# Patient Record
Sex: FEMALE | Race: WHITE | Employment: OTHER | URBAN - NONMETROPOLITAN AREA
[De-identification: names, ages, dates, MRNs, and addresses within clinical notes are randomized per-mention and may not be internally consistent; named-entity substitution may affect disease eponyms.]

---

## 2018-01-24 PROBLEM — M72.0 DUPUYTREN'S CONTRACTURE OF BOTH HANDS: Status: ACTIVE | Noted: 2018-01-24

## 2019-01-11 ENCOUNTER — HOSPITAL ENCOUNTER (OUTPATIENT)
Dept: WOMENS IMAGING | Age: 59
Discharge: HOME | End: 2019-01-13
Payer: COMMERCIAL

## 2019-01-11 DIAGNOSIS — Z12.31 VISIT FOR SCREENING MAMMOGRAM: ICD-10-CM

## 2019-01-11 PROCEDURE — 77067 SCR MAMMO BI INCL CAD: CPT

## 2019-01-11 PROCEDURE — 77063 BREAST TOMOSYNTHESIS BI: CPT

## 2019-01-12 LAB
AVERAGE GLUCOSE: 286 MG/DL (ref 66–114)
HBA1C MFR BLD: 11.6 %

## 2021-05-17 PROBLEM — M65.321 TRIGGER INDEX FINGER OF RIGHT HAND: Status: ACTIVE | Noted: 2021-05-17

## 2021-05-17 PROBLEM — F43.21 GRIEF AT LOSS OF CHILD: Status: ACTIVE | Noted: 2021-05-17

## 2021-05-17 PROBLEM — Z63.4 GRIEF AT LOSS OF CHILD: Status: ACTIVE | Noted: 2021-05-17

## 2022-10-14 PROBLEM — M72.0 DUPUYTREN'S CONTRACTURE OF BOTH HANDS: Status: RESOLVED | Noted: 2018-01-24 | Resolved: 2022-10-14

## 2022-10-14 PROBLEM — Z63.4 GRIEF AT LOSS OF CHILD: Status: RESOLVED | Noted: 2021-05-17 | Resolved: 2022-10-14

## 2022-10-14 PROBLEM — F43.21 GRIEF AT LOSS OF CHILD: Status: RESOLVED | Noted: 2021-05-17 | Resolved: 2022-10-14

## 2022-10-14 PROBLEM — M65.321 TRIGGER INDEX FINGER OF RIGHT HAND: Status: RESOLVED | Noted: 2021-05-17 | Resolved: 2022-10-14

## 2024-03-18 ENCOUNTER — TELEPHONE (OUTPATIENT)
Dept: PHARMACY | Facility: CLINIC | Age: 64
End: 2024-03-18

## 2024-03-20 NOTE — TELEPHONE ENCOUNTER
I talked to her about it she actually states she is taking it regularly.  Her cholesterol actually looks really good.  I do not know about the refills.  Her most recent A1c was 6.6 which is the lowest it has been ever.
  We will talk to the patient but she has a history of noncompliance and I am doubtful that she will take the medicine.
Spoke to [facility] regarding lack of refill claim data since 2023:   Pharmacy confirmed that patient has been refilling both lisinopril and pravastatin - most recently refilled on 24 and then again on 3/14/24.    Per pharmacy, patient has been paying cash for these medications (medications were not refilled using [employer] insurance).   Other medications for patient are refilled with [employer] insurance per pharmacy.  Both 3/14/24 refills of lisinopril and pravastatin were re-processed using [employer] insurance today (cost $0) - both are already ready for  per pharmacy.        Routed update to provider.      Latrice Espinoza, PharmD, [facility]  Population Health Pharmacist  [facility]  Department, toll free: [phone], option 1      For Pharmacy Admin Tracking Only  Program: [email or web address]  CPA in place:  No  Recommendation Provided To: Pharmacy: 2  Intervention Detail: Adherence Monitorin  Intervention Accepted By: Pharmacy: 2  Gap Closed?: Yes   Time Spent (min): 45              
Kb Ayala MD [facility] - Sy...   LISINOPRIL 20MG TAB 03/03/2023 30 30 tablet Kb Ayala MD [facility]   LISINOPRIL 20MG TAB 01/12/2023 30 30 tablet Kb Ayala MD [facility]     Per [place] portal:  Last refilled 6/27/23 x 30 days - all later claims were reversed at [facility]    BP Readings from Last 3 Encounters:   12/12/23 (!) 142/62   09/28/23 115/68   10/14/22 139/65     Estimated Creatinine Clearance: 85 mL/min (based on SCr of 0.68 mg/dL).  Lab Results   Component Value Date    CREATININE 0.68 02/05/2024      Lab Results   Component Value Date    K 4.5 02/05/2024         STATIN GAP IDENTIFIED  Lab Results   Component Value Date    CHOL 164 02/05/2024    TRIG 117 02/05/2024    HDL 46 02/05/2024    LDLCALC 95 02/05/2024     ALT   Date Value Ref Range Status   02/05/2024 8 U/L Final     AST   Date Value Ref Range Status   02/05/2024 11 U/L Final       The 10-year ASCVD risk score (Morgan OLIVEROS, et al., 2019) is: 28.5%    Values used to calculate the score:      Age: 63 years      Sex: Female      Is Non- : No      Diabetic: Yes      Tobacco smoker: Yes      Systolic Blood Pressure: 142 mmHg      Is BP treated: Yes      HDL Cholesterol: 46 mg/dL      Total Cholesterol: 164 mg/dL       Per chart review, statin therapy is recommended as per the 2024 ADA Guidelines:  For primary prevention:     Patients with diabetes (age 40-75) at higher cardiovascular risk (including patients with one or more ASCVD risk factors:  duration of diabetes, obesity/overweight, family history of premature ASCVD, hypertension, dyslipidemia, smoking, or CKD/albuminuria): high-intensity statin is recommended.  It is recommended to reduce LDL >/=50% from baseline and to a target LDL goal <70.      Per chart review, a statin is prescribed for the patient; however, the patient has not refilled and picked up the medication in 2024.  Patient is prescribed pravastatin 40

## 2024-08-13 ENCOUNTER — APPOINTMENT (OUTPATIENT)
Dept: GENERAL RADIOLOGY | Age: 64
DRG: 418 | End: 2024-08-13
Payer: MEDICARE

## 2024-08-13 ENCOUNTER — HOSPITAL ENCOUNTER (INPATIENT)
Age: 64
LOS: 4 days | Discharge: HOME | DRG: 418 | End: 2024-08-18
Attending: EMERGENCY MEDICINE | Admitting: INTERNAL MEDICINE
Payer: MEDICARE

## 2024-08-13 DIAGNOSIS — K81.0 ACUTE CHOLECYSTITIS: ICD-10-CM

## 2024-08-13 DIAGNOSIS — R52 PAIN: Primary | ICD-10-CM

## 2024-08-13 LAB
BASOPHILS # BLD: 0.05 K/UL (ref 0–0.2)
BASOPHILS NFR BLD: 0 % (ref 0–2)
EOSINOPHIL # BLD: <0.03 K/UL (ref 0–0.44)
EOSINOPHILS RELATIVE PERCENT: 0 % (ref 1–4)
ERYTHROCYTE [DISTWIDTH] IN BLOOD BY AUTOMATED COUNT: 16.2 % (ref 11.8–14.4)
GLUCOSE BLD-MCNC: 232 MG/DL (ref 74–100)
HCT VFR BLD AUTO: 43.1 % (ref 36.3–47.1)
HGB BLD-MCNC: 14.4 G/DL (ref 11.9–15.1)
IMM GRANULOCYTES # BLD AUTO: 0.13 K/UL (ref 0–0.3)
IMM GRANULOCYTES NFR BLD: 1 %
LYMPHOCYTES NFR BLD: 1.79 K/UL (ref 1.1–3.7)
LYMPHOCYTES RELATIVE PERCENT: 10 % (ref 24–43)
MCH RBC QN AUTO: 30.4 PG (ref 25.2–33.5)
MCHC RBC AUTO-ENTMCNC: 33.4 G/DL (ref 28.4–34.8)
MCV RBC AUTO: 91.1 FL (ref 82.6–102.9)
MONOCYTES NFR BLD: 0.72 K/UL (ref 0.1–1.2)
MONOCYTES NFR BLD: 4 % (ref 3–12)
NEUTROPHILS NFR BLD: 85 % (ref 36–65)
NEUTS SEG NFR BLD: 14.44 K/UL (ref 1.5–8.1)
NRBC BLD-RTO: 0 PER 100 WBC
PLATELET # BLD AUTO: 282 K/UL (ref 138–453)
PMV BLD AUTO: 12.8 FL (ref 8.1–13.5)
RBC # BLD AUTO: 4.73 M/UL (ref 3.95–5.11)
WBC OTHER # BLD: 17.1 K/UL (ref 3.5–11.3)

## 2024-08-13 PROCEDURE — 80048 BASIC METABOLIC PNL TOTAL CA: CPT

## 2024-08-13 PROCEDURE — 84484 ASSAY OF TROPONIN QUANT: CPT

## 2024-08-13 PROCEDURE — 99285 EMERGENCY DEPT VISIT HI MDM: CPT

## 2024-08-13 PROCEDURE — 82947 ASSAY GLUCOSE BLOOD QUANT: CPT

## 2024-08-13 PROCEDURE — 85025 COMPLETE CBC W/AUTO DIFF WBC: CPT

## 2024-08-13 PROCEDURE — 71045 X-RAY EXAM CHEST 1 VIEW: CPT

## 2024-08-13 PROCEDURE — 83036 HEMOGLOBIN GLYCOSYLATED A1C: CPT

## 2024-08-13 PROCEDURE — 93005 ELECTROCARDIOGRAM TRACING: CPT | Performed by: EMERGENCY MEDICINE

## 2024-08-13 ASSESSMENT — LIFESTYLE VARIABLES
HOW OFTEN DO YOU HAVE A DRINK CONTAINING ALCOHOL: NEVER
HOW MANY STANDARD DRINKS CONTAINING ALCOHOL DO YOU HAVE ON A TYPICAL DAY: PATIENT DOES NOT DRINK

## 2024-08-13 ASSESSMENT — PAIN DESCRIPTION - LOCATION: LOCATION: CHEST

## 2024-08-13 ASSESSMENT — PAIN - FUNCTIONAL ASSESSMENT: PAIN_FUNCTIONAL_ASSESSMENT: 0-10

## 2024-08-13 ASSESSMENT — PAIN SCALES - GENERAL: PAINLEVEL_OUTOF10: 8

## 2024-08-14 ENCOUNTER — APPOINTMENT (OUTPATIENT)
Dept: CT IMAGING | Age: 64
DRG: 418 | End: 2024-08-14
Payer: MEDICARE

## 2024-08-14 ENCOUNTER — ANESTHESIA EVENT (OUTPATIENT)
Dept: OPERATING ROOM | Age: 64
End: 2024-08-14
Payer: MEDICARE

## 2024-08-14 ENCOUNTER — ANESTHESIA (OUTPATIENT)
Dept: OPERATING ROOM | Age: 64
End: 2024-08-14
Payer: MEDICARE

## 2024-08-14 PROBLEM — R09.02 HYPOXIA: Status: ACTIVE | Noted: 2024-08-14

## 2024-08-14 PROBLEM — K81.0 ACUTE CHOLECYSTITIS: Status: ACTIVE | Noted: 2024-08-14

## 2024-08-14 LAB
ALBUMIN SERPL-MCNC: 4.2 G/DL (ref 3.5–5.2)
ALBUMIN/GLOB SERPL: 1.3 {RATIO} (ref 1–2.5)
ALP SERPL-CCNC: 134 U/L (ref 35–104)
ALT SERPL-CCNC: 6 U/L (ref 5–33)
AMPHET UR QL SCN: NEGATIVE
ANION GAP SERPL CALCULATED.3IONS-SCNC: 13 MMOL/L (ref 9–17)
ARTERIAL PATENCY WRIST A: YES
AST SERPL-CCNC: 23 U/L
BARBITURATES UR QL SCN: NEGATIVE
BDY SITE: ABNORMAL
BENZODIAZ UR QL: NEGATIVE
BILIRUB DIRECT SERPL-MCNC: <0.1 MG/DL
BILIRUB INDIRECT SERPL-MCNC: ABNORMAL MG/DL (ref 0–1)
BILIRUB SERPL-MCNC: 0.3 MG/DL (ref 0.3–1.2)
BILIRUB UR QL STRIP: NEGATIVE
BODY TEMPERATURE: 37
BUN SERPL-MCNC: 12 MG/DL (ref 8–23)
BUN/CREAT SERPL: 15 (ref 9–20)
BUPRENORPHINE UR QL: NEGATIVE
CALCIUM SERPL-MCNC: 9 MG/DL (ref 8.6–10.4)
CANNABINOIDS UR QL SCN: NEGATIVE
CHLORIDE SERPL-SCNC: 96 MMOL/L (ref 98–107)
CLARITY UR: CLEAR
CO2 SERPL-SCNC: 25 MMOL/L (ref 20–31)
COCAINE UR QL SCN: NEGATIVE
COLOR UR: YELLOW
CREAT SERPL-MCNC: 0.8 MG/DL (ref 0.5–0.9)
EKG ATRIAL RATE: 67 BPM
EKG P AXIS: 56 DEGREES
EKG P-R INTERVAL: 182 MS
EKG Q-T INTERVAL: 422 MS
EKG QRS DURATION: 96 MS
EKG QTC CALCULATION (BAZETT): 445 MS
EKG R AXIS: -4 DEGREES
EKG T AXIS: 25 DEGREES
EKG VENTRICULAR RATE: 67 BPM
EPI CELLS #/AREA URNS HPF: NORMAL /HPF (ref 0–25)
EST. AVERAGE GLUCOSE BLD GHB EST-MCNC: 177 MG/DL
FENTANYL UR QL: NEGATIVE
FIO2 ON VENT: 24 %
GAS FLOW.O2 O2 DELIVERY SYS: ABNORMAL L/MIN
GFR, ESTIMATED: 81 ML/MIN/1.73M2
GLUCOSE BLD-MCNC: 187 MG/DL (ref 74–100)
GLUCOSE BLD-MCNC: 221 MG/DL (ref 74–100)
GLUCOSE BLD-MCNC: 225 MG/DL (ref 74–100)
GLUCOSE BLD-MCNC: 230 MG/DL (ref 74–100)
GLUCOSE SERPL-MCNC: 238 MG/DL (ref 70–99)
GLUCOSE UR STRIP-MCNC: NEGATIVE MG/DL
HBA1C MFR BLD: 7.8 % (ref 4–6)
HCO3 ARTERIAL: 22.2 MMOL/L (ref 22–26)
HGB UR QL STRIP.AUTO: NEGATIVE
KETONES UR STRIP-MCNC: ABNORMAL MG/DL
LACTATE BLDV-SCNC: 1.2 MMOL/L (ref 0.5–1.9)
LEUKOCYTE ESTERASE UR QL STRIP: NEGATIVE
LIPASE SERPL-CCNC: 13 U/L (ref 13–60)
METHADONE UR QL: NEGATIVE
NEGATIVE BASE EXCESS, ART: 2.8 MMOL/L (ref 0–2)
NITRITE UR QL STRIP: NEGATIVE
O2 SAT, ARTERIAL: 95.2 % (ref 95–98)
OPIATES UR QL SCN: NEGATIVE
OXYCODONE UR QL SCN: NEGATIVE
PCO2 ARTERIAL: 39.6 MMHG (ref 35–45)
PCO2, ART, TEMP ADJ: 39.6 (ref 35–45)
PCP UR QL SCN: NEGATIVE
PH ARTERIAL: 7.37 (ref 7.35–7.45)
PH UR STRIP: 7 [PH] (ref 5–9)
PH, ART, TEMP ADJ: 7.37 (ref 7.35–7.45)
PO2 ARTERIAL: 77.6 MMHG (ref 80–100)
PO2, ART, TEMP ADJ: 77.6 MMHG (ref 80–100)
POTASSIUM SERPL-SCNC: 4.1 MMOL/L (ref 3.7–5.3)
PROT SERPL-MCNC: 7.5 G/DL (ref 6.4–8.3)
PROT UR STRIP-MCNC: ABNORMAL MG/DL
RBC #/AREA URNS HPF: NORMAL /HPF (ref 0–2)
SARS-COV-2 RDRP RESP QL NAA+PROBE: NOT DETECTED
SODIUM SERPL-SCNC: 134 MMOL/L (ref 135–144)
SP GR UR STRIP: 1.02 (ref 1.01–1.02)
SPECIMEN DESCRIPTION: NORMAL
TEST INFORMATION: NORMAL
TROPONIN I SERPL HS-MCNC: 10 NG/L (ref 0–14)
TROPONIN I SERPL HS-MCNC: 7 NG/L (ref 0–14)
TROPONIN I SERPL HS-MCNC: 7 NG/L (ref 0–14)
UROBILINOGEN UR STRIP-ACNC: NORMAL EU/DL (ref 0–1)
WBC #/AREA URNS HPF: NORMAL /HPF (ref 0–5)

## 2024-08-14 PROCEDURE — 83605 ASSAY OF LACTIC ACID: CPT

## 2024-08-14 PROCEDURE — 6360000002 HC RX W HCPCS: Performed by: NURSE PRACTITIONER

## 2024-08-14 PROCEDURE — 82805 BLOOD GASES W/O2 SATURATION: CPT

## 2024-08-14 PROCEDURE — 80307 DRUG TEST PRSMV CHEM ANLYZR: CPT

## 2024-08-14 PROCEDURE — 80076 HEPATIC FUNCTION PANEL: CPT

## 2024-08-14 PROCEDURE — 2580000003 HC RX 258: Performed by: NURSE PRACTITIONER

## 2024-08-14 PROCEDURE — 6360000002 HC RX W HCPCS

## 2024-08-14 PROCEDURE — 83690 ASSAY OF LIPASE: CPT

## 2024-08-14 PROCEDURE — 36415 COLL VENOUS BLD VENIPUNCTURE: CPT

## 2024-08-14 PROCEDURE — 93010 ELECTROCARDIOGRAM REPORT: CPT | Performed by: FAMILY MEDICINE

## 2024-08-14 PROCEDURE — 2500000003 HC RX 250 WO HCPCS: Performed by: EMERGENCY MEDICINE

## 2024-08-14 PROCEDURE — 94761 N-INVAS EAR/PLS OXIMETRY MLT: CPT

## 2024-08-14 PROCEDURE — 6360000004 HC RX CONTRAST MEDICATION: Performed by: EMERGENCY MEDICINE

## 2024-08-14 PROCEDURE — 2580000003 HC RX 258

## 2024-08-14 PROCEDURE — 6360000002 HC RX W HCPCS: Performed by: EMERGENCY MEDICINE

## 2024-08-14 PROCEDURE — 71260 CT THORAX DX C+: CPT

## 2024-08-14 PROCEDURE — 74177 CT ABD & PELVIS W/CONTRAST: CPT

## 2024-08-14 PROCEDURE — 36600 WITHDRAWAL OF ARTERIAL BLOOD: CPT

## 2024-08-14 PROCEDURE — 6360000004 HC RX CONTRAST MEDICATION: Performed by: NURSE PRACTITIONER

## 2024-08-14 PROCEDURE — 81001 URINALYSIS AUTO W/SCOPE: CPT

## 2024-08-14 PROCEDURE — 96375 TX/PRO/DX INJ NEW DRUG ADDON: CPT

## 2024-08-14 PROCEDURE — 70450 CT HEAD/BRAIN W/O DYE: CPT

## 2024-08-14 PROCEDURE — 1200000000 HC SEMI PRIVATE

## 2024-08-14 PROCEDURE — 82947 ASSAY GLUCOSE BLOOD QUANT: CPT

## 2024-08-14 PROCEDURE — 2700000000 HC OXYGEN THERAPY PER DAY

## 2024-08-14 PROCEDURE — 87635 SARS-COV-2 COVID-19 AMP PRB: CPT

## 2024-08-14 PROCEDURE — 6370000000 HC RX 637 (ALT 250 FOR IP): Performed by: NURSE PRACTITIONER

## 2024-08-14 PROCEDURE — 96365 THER/PROPH/DIAG IV INF INIT: CPT

## 2024-08-14 PROCEDURE — 93005 ELECTROCARDIOGRAM TRACING: CPT | Performed by: NURSE PRACTITIONER

## 2024-08-14 PROCEDURE — 84484 ASSAY OF TROPONIN QUANT: CPT

## 2024-08-14 PROCEDURE — 70496 CT ANGIOGRAPHY HEAD: CPT

## 2024-08-14 PROCEDURE — 2580000003 HC RX 258: Performed by: EMERGENCY MEDICINE

## 2024-08-14 PROCEDURE — 6370000000 HC RX 637 (ALT 250 FOR IP)

## 2024-08-14 PROCEDURE — 87040 BLOOD CULTURE FOR BACTERIA: CPT

## 2024-08-14 RX ORDER — ENALAPRILAT 1.25 MG/ML
1.25 INJECTION INTRAVENOUS EVERY 6 HOURS PRN
Status: DISCONTINUED | OUTPATIENT
Start: 2024-08-14 | End: 2024-08-18 | Stop reason: HOSPADM

## 2024-08-14 RX ORDER — POLYETHYLENE GLYCOL 3350 17 G/17G
17 POWDER, FOR SOLUTION ORAL DAILY PRN
Status: DISCONTINUED | OUTPATIENT
Start: 2024-08-14 | End: 2024-08-18 | Stop reason: HOSPADM

## 2024-08-14 RX ORDER — 0.9 % SODIUM CHLORIDE 0.9 %
1000 INTRAVENOUS SOLUTION INTRAVENOUS ONCE
Status: COMPLETED | OUTPATIENT
Start: 2024-08-14 | End: 2024-08-14

## 2024-08-14 RX ORDER — IOPAMIDOL 755 MG/ML
75 INJECTION, SOLUTION INTRAVASCULAR
Status: COMPLETED | OUTPATIENT
Start: 2024-08-14 | End: 2024-08-14

## 2024-08-14 RX ORDER — POTASSIUM CHLORIDE 1500 MG/1
40 TABLET, EXTENDED RELEASE ORAL PRN
Status: DISCONTINUED | OUTPATIENT
Start: 2024-08-14 | End: 2024-08-18 | Stop reason: HOSPADM

## 2024-08-14 RX ORDER — METOPROLOL TARTRATE 1 MG/ML
5 INJECTION, SOLUTION INTRAVENOUS ONCE
Status: DISCONTINUED | OUTPATIENT
Start: 2024-08-14 | End: 2024-08-14

## 2024-08-14 RX ORDER — MORPHINE SULFATE 4 MG/ML
4 INJECTION, SOLUTION INTRAMUSCULAR; INTRAVENOUS
Status: DISCONTINUED | OUTPATIENT
Start: 2024-08-14 | End: 2024-08-18 | Stop reason: HOSPADM

## 2024-08-14 RX ORDER — INSULIN LISPRO 100 [IU]/ML
0-4 INJECTION, SOLUTION INTRAVENOUS; SUBCUTANEOUS NIGHTLY
Status: DISCONTINUED | OUTPATIENT
Start: 2024-08-14 | End: 2024-08-18

## 2024-08-14 RX ORDER — INSULIN LISPRO 100 [IU]/ML
0-8 INJECTION, SOLUTION INTRAVENOUS; SUBCUTANEOUS
Status: DISCONTINUED | OUTPATIENT
Start: 2024-08-14 | End: 2024-08-18

## 2024-08-14 RX ORDER — SODIUM CHLORIDE 0.9 % (FLUSH) 0.9 %
10 SYRINGE (ML) INJECTION EVERY 12 HOURS SCHEDULED
Status: DISCONTINUED | OUTPATIENT
Start: 2024-08-14 | End: 2024-08-18 | Stop reason: HOSPADM

## 2024-08-14 RX ORDER — METOPROLOL TARTRATE 1 MG/ML
5 INJECTION, SOLUTION INTRAVENOUS ONCE
Status: COMPLETED | OUTPATIENT
Start: 2024-08-14 | End: 2024-08-14

## 2024-08-14 RX ORDER — ACETAMINOPHEN 325 MG/1
650 TABLET ORAL EVERY 6 HOURS PRN
Status: DISCONTINUED | OUTPATIENT
Start: 2024-08-14 | End: 2024-08-18 | Stop reason: HOSPADM

## 2024-08-14 RX ORDER — SODIUM CHLORIDE 9 MG/ML
INJECTION, SOLUTION INTRAVENOUS CONTINUOUS
Status: DISCONTINUED | OUTPATIENT
Start: 2024-08-14 | End: 2024-08-16

## 2024-08-14 RX ORDER — FAMOTIDINE 20 MG/1
20 TABLET, FILM COATED ORAL 2 TIMES DAILY
Status: DISCONTINUED | OUTPATIENT
Start: 2024-08-14 | End: 2024-08-18 | Stop reason: HOSPADM

## 2024-08-14 RX ORDER — LATANOPROST 50 UG/ML
1 SOLUTION/ DROPS OPHTHALMIC NIGHTLY
COMMUNITY

## 2024-08-14 RX ORDER — DEXTROSE MONOHYDRATE 100 MG/ML
INJECTION, SOLUTION INTRAVENOUS CONTINUOUS PRN
Status: DISCONTINUED | OUTPATIENT
Start: 2024-08-14 | End: 2024-08-18 | Stop reason: HOSPADM

## 2024-08-14 RX ORDER — ONDANSETRON 4 MG/1
4 TABLET, ORALLY DISINTEGRATING ORAL EVERY 8 HOURS PRN
Status: DISCONTINUED | OUTPATIENT
Start: 2024-08-14 | End: 2024-08-18 | Stop reason: HOSPADM

## 2024-08-14 RX ORDER — PROCHLORPERAZINE EDISYLATE 5 MG/ML
10 INJECTION INTRAMUSCULAR; INTRAVENOUS ONCE
Status: DISCONTINUED | OUTPATIENT
Start: 2024-08-14 | End: 2024-08-18 | Stop reason: HOSPADM

## 2024-08-14 RX ORDER — POTASSIUM CHLORIDE 7.45 MG/ML
10 INJECTION INTRAVENOUS PRN
Status: DISCONTINUED | OUTPATIENT
Start: 2024-08-14 | End: 2024-08-18 | Stop reason: HOSPADM

## 2024-08-14 RX ORDER — ACETAMINOPHEN 650 MG/1
650 SUPPOSITORY RECTAL EVERY 6 HOURS PRN
Status: DISCONTINUED | OUTPATIENT
Start: 2024-08-14 | End: 2024-08-18 | Stop reason: HOSPADM

## 2024-08-14 RX ORDER — ONDANSETRON 2 MG/ML
4 INJECTION INTRAMUSCULAR; INTRAVENOUS ONCE
Status: COMPLETED | OUTPATIENT
Start: 2024-08-14 | End: 2024-08-14

## 2024-08-14 RX ORDER — MORPHINE SULFATE 2 MG/ML
2 INJECTION, SOLUTION INTRAMUSCULAR; INTRAVENOUS
Status: DISCONTINUED | OUTPATIENT
Start: 2024-08-14 | End: 2024-08-18 | Stop reason: HOSPADM

## 2024-08-14 RX ORDER — ONDANSETRON 2 MG/ML
4 INJECTION INTRAMUSCULAR; INTRAVENOUS EVERY 6 HOURS PRN
Status: DISCONTINUED | OUTPATIENT
Start: 2024-08-14 | End: 2024-08-18 | Stop reason: HOSPADM

## 2024-08-14 RX ORDER — GLUCAGON 1 MG/ML
1 KIT INJECTION PRN
Status: DISCONTINUED | OUTPATIENT
Start: 2024-08-14 | End: 2024-08-16

## 2024-08-14 RX ORDER — SODIUM CHLORIDE 9 MG/ML
INJECTION, SOLUTION INTRAVENOUS PRN
Status: DISCONTINUED | OUTPATIENT
Start: 2024-08-14 | End: 2024-08-18 | Stop reason: HOSPADM

## 2024-08-14 RX ORDER — SODIUM CHLORIDE 0.9 % (FLUSH) 0.9 %
10 SYRINGE (ML) INJECTION PRN
Status: DISCONTINUED | OUTPATIENT
Start: 2024-08-14 | End: 2024-08-18 | Stop reason: HOSPADM

## 2024-08-14 RX ORDER — LORAZEPAM 2 MG/ML
1 INJECTION INTRAMUSCULAR ONCE
Status: COMPLETED | OUTPATIENT
Start: 2024-08-14 | End: 2024-08-14

## 2024-08-14 RX ADMIN — PIPERACILLIN AND TAZOBACTAM 3375 MG: 3; .375 INJECTION, POWDER, LYOPHILIZED, FOR SOLUTION INTRAVENOUS at 04:51

## 2024-08-14 RX ADMIN — LORAZEPAM 1 MG: 2 INJECTION INTRAMUSCULAR; INTRAVENOUS at 04:42

## 2024-08-14 RX ADMIN — ONDANSETRON 4 MG: 2 INJECTION INTRAMUSCULAR; INTRAVENOUS at 02:07

## 2024-08-14 RX ADMIN — INSULIN LISPRO 2 UNITS: 100 INJECTION, SOLUTION INTRAVENOUS; SUBCUTANEOUS at 11:24

## 2024-08-14 RX ADMIN — INSULIN LISPRO 2 UNITS: 100 INJECTION, SOLUTION INTRAVENOUS; SUBCUTANEOUS at 17:23

## 2024-08-14 RX ADMIN — IOPAMIDOL 75 ML: 755 INJECTION, SOLUTION INTRAVENOUS at 02:32

## 2024-08-14 RX ADMIN — ONDANSETRON 4 MG: 2 INJECTION INTRAMUSCULAR; INTRAVENOUS at 10:26

## 2024-08-14 RX ADMIN — METOPROLOL TARTRATE 5 MG: 5 INJECTION INTRAVENOUS at 04:38

## 2024-08-14 RX ADMIN — SODIUM CHLORIDE: 9 INJECTION, SOLUTION INTRAVENOUS at 17:26

## 2024-08-14 RX ADMIN — MORPHINE SULFATE 4 MG: 4 INJECTION, SOLUTION INTRAMUSCULAR; INTRAVENOUS at 20:46

## 2024-08-14 RX ADMIN — SODIUM CHLORIDE: 9 INJECTION, SOLUTION INTRAVENOUS at 06:57

## 2024-08-14 RX ADMIN — SODIUM CHLORIDE 1000 ML: 9 INJECTION, SOLUTION INTRAVENOUS at 04:07

## 2024-08-14 RX ADMIN — IOPAMIDOL 75 ML: 755 INJECTION, SOLUTION INTRAVENOUS at 13:56

## 2024-08-14 RX ADMIN — PIPERACILLIN AND TAZOBACTAM 3375 MG: 3; .375 INJECTION, POWDER, LYOPHILIZED, FOR SOLUTION INTRAVENOUS at 20:45

## 2024-08-14 RX ADMIN — PIPERACILLIN AND TAZOBACTAM 3375 MG: 3; .375 INJECTION, POWDER, LYOPHILIZED, FOR SOLUTION INTRAVENOUS at 14:21

## 2024-08-14 ASSESSMENT — PAIN - FUNCTIONAL ASSESSMENT
PAIN_FUNCTIONAL_ASSESSMENT: 0-10
PAIN_FUNCTIONAL_ASSESSMENT: NONE - DENIES PAIN
PAIN_FUNCTIONAL_ASSESSMENT: ACTIVITIES ARE NOT PREVENTED
PAIN_FUNCTIONAL_ASSESSMENT: NONE - DENIES PAIN

## 2024-08-14 ASSESSMENT — PAIN DESCRIPTION - DESCRIPTORS: DESCRIPTORS: ACHING

## 2024-08-14 ASSESSMENT — PAIN SCALES - GENERAL
PAINLEVEL_OUTOF10: 8
PAINLEVEL_OUTOF10: 7

## 2024-08-14 ASSESSMENT — PAIN DESCRIPTION - ORIENTATION: ORIENTATION: MID

## 2024-08-14 ASSESSMENT — PAIN DESCRIPTION - LOCATION
LOCATION: ABDOMEN
LOCATION: ABDOMEN

## 2024-08-14 NOTE — PROGRESS NOTES
Patient still drowsy at this time but oriented. Stating she no longer is having any chest pain. Still with nausea and vomiting. Sheridan Robles, IMMANUEL, aware previously about vomiting. Complaining of no new issues at this time. Call light within reach and bed alarm on.    Dapsone Pregnancy And Lactation Text: This medication is Pregnancy Category C and is not considered safe during pregnancy or breast feeding.

## 2024-08-14 NOTE — PROGRESS NOTES
Patient admitted recently to floor. Assessment and vitals obtained. Patient alert and oriented x 4. Complaining of abdominal pain 5/10. Denying chest pain and shortness of breath. Currently on 1L of oxygen. Bed alarm on and call light within reach.

## 2024-08-14 NOTE — H&P
History and Physical    Patient:  Christina Sosa  MRN: [id number]    Chief Complaint: Chest pain    History Obtained From:  patient, electronic medical record    PCP: Kb Ayala MD    History of Present Illness:   The patient is a 64 y.o. female with a past medical history of hypertension, type 2 diabetes and asthma who presents with chest pain that started prior to arrival.  Patient had an unresponsive episode in the car in the parking lot and was a rapid response.  Upon arrival into the emergency department, she became responsive and states she was having chest pain. She is short of breath with it.  She has also been having epigastric pain.  She denies any previous episodes of this pain.  She did have several episodes of emesis in the emergency department.  Patient has been hypertensive in the emergency department with blood pressures as high as 220/72.  Patient was given a dose of IV Lopressor and IV Ativan.  SpO2 was 87% on room air.  It improved to the mid 90s with 2 L of oxygen.  WBC 17.1 with left shift, glucose 238, alk phos 134.  CT chest abdomen pelvis showed cholelithiasis with gallbladder wall thickening and pericholecystic fluid, likely acute cholecystitis.  General surgery was consulted in the emergency department.    Past Medical History:        Diagnosis Date    Anxiety     Asthma     Carpal tunnel syndrome     Diabetes mellitus (HCC)     Glaucoma     Headache(784.0)     Hyperlipidemia     Hypertension     Osteoarthritis     Rotator cuff tendinitis     Left/ some frozen shoulder/ declines surgery    Type 2 diabetes mellitus (HCC)        Past Surgical History:        Procedure Laterality Date    APPENDECTOMY      CARPAL TUNNEL RELEASE Left 2009    CYST REMOVAL Right     arm    EYE SURGERY      FINGER TRIGGER RELEASE Right 2013    Thumb     FOOT SURGERY Bilateral     FOOT SURGERY Bilateral     2 rt 1 lt    HAND SURGERY Bilateral     OTHER SURGICAL HISTORY      nerve transplantation lt arm     TUBAL LIGATION         Medications Prior to Admission:    Prior to Admission medications    Medication Sig Start Date End Date Taking? Authorizing Provider   ferrous sulfate (IRON 325) 325 (65 Fe) MG tablet Take 1 tablet by mouth daily 1/9/24   Kb Ayala MD   lisinopril (PRINIVIL;ZESTRIL) 20 MG tablet Take 1 tablet by mouth daily 12/1/23   Kb Ayala MD   pravastatin (PRAVACHOL) 40 MG tablet Take 1 tablet by mouth daily 12/1/23   Kb Ayala MD   tiZANidine (ZANAFLEX) 2 MG tablet Take 1 tablet by mouth 3 times daily as needed (back pain/spasms) 9/28/23   Adry Velasco, VAUGHN - CNP   HUMALOG 100 UNIT/ML SOLN injection vial  10/11/22   Eric Crowder MD   Multiple Vitamins-Minerals (PRESERVISION AREDS 2) CAPS  9/12/22   Eric Crowder MD   aspirin EC 81 MG EC tablet Take 1 tablet by mouth    Eric Crowder MD   omeprazole 20 MG EC tablet Take by mouth    Eric Crowder MD   glucose blood VI test strips (ONE TOUCH ULTRA TEST) strip TEST FOUR TIMES A DAY. 8/23/17   Kb Ayala MD   ULTICARE PEN NEEDLES 29G X 12.7MM MISC 1 box 8/23/17   Kb Ayala MD   Blood Glucose Monitoring Suppl (ONE TOUCH ULTRA MINI) W/DEVICE KIT  2/6/16   Eric Crowder MD   BD PEN NEEDLE ALLEN U/F 32G X 4 MM MISC USE AS DIRECTED. 4/17/15   Kb Ayala MD       Allergies:  Sulfa antibiotics and Sulfa antibiotics    Social History:   TOBACCO:   reports that she has been smoking cigarettes. She has a 15 pack-year smoking history. She has never used smokeless tobacco.  ETOH:   reports no history of alcohol use.    Family History:       Problem Relation Age of Onset    Mental Illness Mother     Diabetes Father     Diabetes Brother     Heart Disease Brother     Diabetes Brother        Allergies:  Sulfa antibiotics and Sulfa antibiotics    Medications Prior to Admission:    Prior to Admission medications    Medication Sig Start Date End Date Taking? Authorizing

## 2024-08-14 NOTE — PROGRESS NOTES
Samantha JOHNSTON and Karine ZAMBRANO in to assess pt.  After assessment and speaking with Dr Vieira, determined pt was not a safe candidate for surgery at this time and pt to be transferred back up to [facility].

## 2024-08-14 NOTE — PROGRESS NOTES
Progress Note    SUBJECTIVE:    Patient seen for f/u of Acute cholecystitis.  She resting in bed no distress. Drowsy. Insulin pump on but not hypoglycemic.  On 1L of oxygen but does not wear at home.  Admits to smoking.  Pain to RUQ.  Vomit on the floor.      ROS:   Constitutional: negative  for fevers, and negative for chills.  Respiratory: negative for shortness of breath, negative for cough, and negative for wheezing  Cardiovascular: negative for chest pain, and negative for palpitations  Gastrointestinal: positive for abdominal pain, positive for nausea,positive for vomiting, negative for diarrhea, and negative for constipation     All other systems were reviewed with the patient and are negative unless otherwise stated in HPI      OBJECTIVE:      Vitals:   Vitals:    08/14/24 0655   BP: (!) 158/72   Pulse: 75   Resp: 20   Temp: 97.1 °F (36.2 °C)   SpO2: 97%               Weight  Wt Readings from Last 3 Encounters:   03/19/24 89.4 kg (197 lb)   12/12/23 88.9 kg (196 lb)   12/05/23 87.5 kg (193 lb)     There is no height or weight on file to calculate BMI.    24HR INTAKE/OUTPUT:    No intake or output data in the 24 hours ending 08/14/24 0821  -----------------------------------------------------------------  Exam:    GEN:   Drowsy but awakens talks appropriately  EYES:  EOMI, pupils equal   NECK: Supple. No lymphadenopathy.  No carotid bruit  CVS:    regular rate and rhythm, no audible murmur  PULM:  CTA, no wheezes, rales or rhonchi, no acute respiratory distress  ABD:    Bowels sounds normal.  Abdomen is soft.  No distention.  RUQ tenderness to palpation.   EXT:   no edema bilaterally .  No calf tenderness.   NEURO: Moves all extremities.  Motor and sensory are grossly intact  SKIN:  No rashes.  No skin lesions.    -----------------------------------------------------------------    Diagnostic Data:      Complete Blood Count:   Recent Labs     08/13/24  2324   WBC 17.1*   RBC 4.73   HGB 14.4   HCT 43.1   MCV  Coronary artery atherosclerosis.   Abdomen and pelvis:      1. Cholelithiasis with gallbladder wall thickening and pericholecystic fluid.   This is suspicious for acute cholecystitis.   2. Fibroid uterus.         XR CHEST PORTABLE   Final Result   Low lung volumes with bronchovascular crowding.  No consolidation.               ASSESSMENT / PLAN:    MEDICAL DECISION MAKING:    Primary Problem(s): Acute cholecystitis  Differential diagnoses: Gastritis, peptic ulcer, GERD  Condition is an undiagnosed new problem with uncertain prognosis  Condition is stable  Treatment plan:   Appreciate Dr Vieira  Appreciate Anesthesia  N.p.o.  Monitor labs and replace electrolytes  Imaging: no further imaging studies ordered today  Medications:   Continue Zosyn  Continue Zofran  Compazine 10 mg IV x 1 now  Morphine as needed for pain  Medication Monitoring / High Risk Medications: Parenteral administration of controlled substance(s)      Type 2 diabetes  Condition is a chronic stable condition  Treatment plan:   POC glucose every 4 hours  Imaging: no further imaging studies ordered today  Medications:   Humalog sliding scale  Hypoglycemia protocol  Remove Insulin Pump - NPO and Vomiting    Hypertension  Condition is a chronic stable condition  Treatment plan: Continue current treatment  Imaging: no further imaging studies ordered today  Medications:   Vasotec as needed    Hypoxia  Condition is stable  Treatment plan:   Wean Oxygen  IS  AGB stat  Imaging: no further imaging studies ordered today  Chest x-ray-no acute process  Medications: Medications not indicated at this time    Nutrition status:   at risk for malnutrition  Dietician consult initiated    Hospital Prophylaxis:   DVT: SCD's   Stress Ulcer: Not indicated at this time     Disposition:  Shared decision making: All test results, treatment options and disposition options were discussed with the patient today  Social determinants of health that may impact management:

## 2024-08-14 NOTE — PLAN OF CARE
Problem: Discharge Planning  Goal: Discharge to home or other facility with appropriate resources  8/14/2024 0810 by Genet Clark RN  Flowsheets (Taken 8/14/2024 0810)  Discharge to home or other facility with appropriate resources: Identify barriers to discharge with patient and caregiver  8/14/2024 0810 by Genet Clark RN  Outcome: Progressing  Flowsheets (Taken 8/14/2024 0810)  Discharge to home or other facility with appropriate resources: Identify barriers to discharge with patient and caregiver     Problem: Pain  Goal: Verbalizes/displays adequate comfort level or baseline comfort level  8/14/2024 0810 by Genet Clark RN  Flowsheets (Taken 8/14/2024 0810)  Verbalizes/displays adequate comfort level or baseline comfort level: Assess pain using appropriate pain scale  8/14/2024 0810 by Genet Clark RN  Outcome: Progressing  Flowsheets (Taken 8/14/2024 0810)  Verbalizes/displays adequate comfort level or baseline comfort level: Assess pain using appropriate pain scale     Problem: Safety - Adult  Goal: Free from fall injury  8/14/2024 0810 by Genet Clark RN  Flowsheets (Taken 8/14/2024 0810)  Free From Fall Injury: Instruct family/caregiver on patient safety  8/14/2024 0810 by Genet Clark RN  Outcome: Progressing  Flowsheets (Taken 8/14/2024 0810)  Free From Fall Injury: Instruct family/caregiver on patient safety

## 2024-08-14 NOTE — PROGRESS NOTES
Pt requesting to use the bathroom.  Pt agreeable to use bedpan d/t her current drowsy state.  Pt urinated without difficulty.      When asked, pt states her chest pain feels better, but still \"hurts a little bit.\"  Pt states the pain \"comes and goes.\"  RN will continue to monitor.

## 2024-08-14 NOTE — PROGRESS NOTES
Objective:    Patient surgical case was canceled by CRNA's at Dr. Vieira agreed due to decreased responsiveness in preop area.  They were concerned about a neurological episode as patient had an unresponsive episode prior to coming into the emergency room but no neurological workup was completed.  Patient continued to complain of chest pain with negative workup.  Patient returned back to the floor for workup.      Plan:    1.  Repeat troponin-10, initial was 7  2.  CT head without contrast-no acute findings  3.  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  I did call and speak with Dr. Laboy, vascular surgeon who stated this is a small aneurysm and this is something that would be observed but would not cause her symptoms.  Recommended no other treatments.  4.  ABG done preop-normal  5.  CT chest-negative for pulmonary embolism.  6.  Lactic acid 1.2  7.  Repeat EKG-NSR, NO ST/T Wave abnormalities and no change from previous.  I feel her Chest pain is d/t Cholecystitis with Cholelithiasis.    8.  Urine Drug Screen off admission urine-Negative    Sheridan Leary, APRN - CNP

## 2024-08-14 NOTE — PROGRESS NOTES
Pt c/o sharp pain in the middle of her chest.  Rating pain 10/10, but still very drowsy and audibly snoring if not actively talked to.  Vital signs obtained, no change to previous vital signs or EKG rhythm.  RN will continue to monitor.

## 2024-08-14 NOTE — PROGRESS NOTES
Spoke to patient's daughter on the phone and provided update. Patient's daughter to call patient's  and provide update. Attempted to call patient's  earlier to let him know plan.

## 2024-08-14 NOTE — PROGRESS NOTES
[facility]  Inpatient/Observation/Outpatient Rehabilitation    Date: 2024  Patient Name: Christina Sosa       [x] Inpatient Acute/Observation      : 1960     [x] Pt refused/declined therapy at this time due to: OT partha attempted. Pt refused therapy at this time d/t fatigue and asked therapy come back tomorow.          Therapist will attempt to see this patient, at our earliest opportunity.       Frank Barajas, OTD, OTR/L Date: 2024

## 2024-08-14 NOTE — PROGRESS NOTES
Pt brought to preop area from [facility].  Pt very lethargic and c/o of chest pain.  SBP 200s.  Pt is oriented x3 but is difficult to arouse.  Asked pt if she has had chest pain like this before and she states no.  Also asked if it is radiating down her arm she states no.  Spoke with Dr. Vieira on the phone regarding pt disposition and agrees pt should have further workup for mental status changes.  Per ER physician note pt arrived in private vehicle had an episode of unresponsiveness and rapid response was called.  Discussed plan of care with Sheridan Robles CNP and that patient could not undergo general anesthesia until further workup for mental status

## 2024-08-14 NOTE — ED PROVIDER NOTES
[facility] ED  EMERGENCY DEPARTMENT ENCOUNTER      Pt Name: Christina Sosa  MRN: [id number]  Birthdate 1960  Date of evaluation: 8/13/2024  Provider: Frieda Luna MD    CHIEF COMPLAINT       Chief Complaint   Patient presents with   • Chest Pain     Patient was a rapid from the parking lot, unresponsive in the car, patient now talking and states she has been having chest pain         HISTORY OF PRESENT ILLNESS   (Location/Symptom, Timing/Onset, Context/Setting, Quality, Duration, Modifying Factors, Severity)  Note limiting factors.   Christina Sosa is a 64 y.o. female who presents to the emergency department ***     HPI    Nursing Notes were reviewed.    REVIEW OF SYSTEMS    (2-9 systems for level 4, 10 or more for level 5)     Review of Systems    Except as noted above the remainder of the review of systems was reviewed and negative.       PAST MEDICAL HISTORY     Past Medical History:   Diagnosis Date   • Anxiety    • Asthma    • Carpal tunnel syndrome    • Diabetes mellitus (HCC)    • Glaucoma    • Headache(784.0)    • Hyperlipidemia    • Hypertension    • Osteoarthritis    • Rotator cuff tendinitis     Left/ some frozen shoulder/ declines surgery   • Type 2 diabetes mellitus (HCC)          SURGICAL HISTORY       Past Surgical History:   Procedure Laterality Date   • APPENDECTOMY     • CARPAL TUNNEL RELEASE Left 2009   • CYST REMOVAL Right     arm   • EYE SURGERY     • FINGER TRIGGER RELEASE Right 2013    Thumb    • FOOT SURGERY Bilateral    • FOOT SURGERY Bilateral     2 rt 1 lt   • HAND SURGERY Bilateral    • OTHER SURGICAL HISTORY      nerve transplantation lt arm   • TUBAL LIGATION           CURRENT MEDICATIONS       Previous Medications    ASPIRIN EC 81 MG EC TABLET    Take 1 tablet by mouth    BD PEN NEEDLE ALLEN U/F 32G X 4 MM MISC    USE AS DIRECTED.    BLOOD GLUCOSE MONITORING SUPPL (ONE TOUCH ULTRA MINI) W/DEVICE KIT        FERROUS SULFATE (IRON 325) 325 (65 FE) MG TABLET    Take 1  Notable for the following components:    Alkaline Phosphatase 134 (*)     All other components within normal limits   COVID-19, RAPID   CULTURE, BLOOD 1   CULTURE, BLOOD 2   TROPONIN   TROPONIN   LIPASE   LACTATE, SEPSIS   MICROSCOPIC URINALYSIS       All other labs were within normal range or not returned as of this dictation.    EMERGENCY DEPARTMENT COURSE and DIFFERENTIAL DIAGNOSIS/MDM:   Vitals:    Vitals:    08/14/24 0325 08/14/24 0400 08/14/24 0430 08/14/24 0452   BP: (!) 187/53 (!) 192/60 (!) 220/72 (!) 185/86   Pulse: 70 66 71 74   Resp: 19 19 16 17   SpO2: (!) 88% 96% 96%        ***    MDM      MIPS  ***     REASSESSMENT          CRITICAL CARE TIME   Total Critical Care time was *** minutes, excluding separately reportable procedures.  There was a high probability of clinically significant/life threatening deterioration in the patient's condition which required my urgent intervention.  ***    CONSULTS:  None    PROCEDURES:  Unless otherwise noted below, none     Procedures    No LOS Charge filed ***    FINAL IMPRESSION    No diagnosis found.      DISPOSITION/PLAN   DISPOSITION Decision To Admit 08/14/2024 05:03:37 AM      PATIENT REFERRED TO:  No follow-up provider specified.    DISCHARGE MEDICATIONS:  New Prescriptions    No medications on file     Controlled Substances Monitoring:          No data to display                (Please note that portions of this note were completed with a voice recognition program.  Efforts were made to edit the dictations but occasionally words are mis-transcribed.)    Frieda Lnua MD (electronically signed)  Attending Emergency Physician

## 2024-08-14 NOTE — PROGRESS NOTES
Spoke to Dr. Herndon on the phone. Instructed to continue to keep patient NPO, keep her on IV antibiotics, and make sure she does not receive any blood thinners. Patient will be an add on case either today or early tomorrow morning.     Call back received. Dr. Vieira is doing surgery on this patient today.

## 2024-08-15 ENCOUNTER — ANESTHESIA EVENT (OUTPATIENT)
Dept: OPERATING ROOM | Age: 64
End: 2024-08-15
Payer: MEDICARE

## 2024-08-15 LAB
ALBUMIN SERPL-MCNC: 3.6 G/DL (ref 3.5–5.2)
ALBUMIN/GLOB SERPL: 1.2 {RATIO} (ref 1–2.5)
ALP SERPL-CCNC: 116 U/L (ref 35–104)
ALT SERPL-CCNC: 6 U/L (ref 5–33)
ANION GAP SERPL CALCULATED.3IONS-SCNC: 11 MMOL/L (ref 9–17)
AST SERPL-CCNC: 9 U/L
BASOPHILS # BLD: 0.03 K/UL (ref 0–0.2)
BASOPHILS NFR BLD: 0 % (ref 0–2)
BILIRUB SERPL-MCNC: 0.6 MG/DL (ref 0.3–1.2)
BUN SERPL-MCNC: 7 MG/DL (ref 8–23)
BUN/CREAT SERPL: 12 (ref 9–20)
CALCIUM SERPL-MCNC: 8.4 MG/DL (ref 8.6–10.4)
CHLORIDE SERPL-SCNC: 100 MMOL/L (ref 98–107)
CO2 SERPL-SCNC: 25 MMOL/L (ref 20–31)
CREAT SERPL-MCNC: 0.6 MG/DL (ref 0.5–0.9)
EKG ATRIAL RATE: 77 BPM
EKG P AXIS: 67 DEGREES
EKG P-R INTERVAL: 186 MS
EKG Q-T INTERVAL: 398 MS
EKG QRS DURATION: 96 MS
EKG QTC CALCULATION (BAZETT): 450 MS
EKG R AXIS: -10 DEGREES
EKG T AXIS: 14 DEGREES
EKG VENTRICULAR RATE: 77 BPM
EOSINOPHIL # BLD: <0.03 K/UL (ref 0–0.44)
EOSINOPHILS RELATIVE PERCENT: 0 % (ref 1–4)
ERYTHROCYTE [DISTWIDTH] IN BLOOD BY AUTOMATED COUNT: 16.3 % (ref 11.8–14.4)
GFR, ESTIMATED: >90 ML/MIN/1.73M2
GLUCOSE BLD-MCNC: 190 MG/DL (ref 74–100)
GLUCOSE BLD-MCNC: 219 MG/DL (ref 74–100)
GLUCOSE BLD-MCNC: 240 MG/DL (ref 74–100)
GLUCOSE BLD-MCNC: 253 MG/DL (ref 74–100)
GLUCOSE BLD-MCNC: 283 MG/DL (ref 74–100)
GLUCOSE BLD-MCNC: 315 MG/DL (ref 74–100)
GLUCOSE SERPL-MCNC: 291 MG/DL (ref 70–99)
HCT VFR BLD AUTO: 38.7 % (ref 36.3–47.1)
HGB BLD-MCNC: 13.3 G/DL (ref 11.9–15.1)
IMM GRANULOCYTES # BLD AUTO: 0.15 K/UL (ref 0–0.3)
IMM GRANULOCYTES NFR BLD: 1 %
LYMPHOCYTES NFR BLD: 1.89 K/UL (ref 1.1–3.7)
LYMPHOCYTES RELATIVE PERCENT: 10 % (ref 24–43)
MCH RBC QN AUTO: 31.1 PG (ref 25.2–33.5)
MCHC RBC AUTO-ENTMCNC: 34.4 G/DL (ref 28.4–34.8)
MCV RBC AUTO: 90.4 FL (ref 82.6–102.9)
MONOCYTES NFR BLD: 1.36 K/UL (ref 0.1–1.2)
MONOCYTES NFR BLD: 7 % (ref 3–12)
NEUTROPHILS NFR BLD: 82 % (ref 36–65)
NEUTS SEG NFR BLD: 15.92 K/UL (ref 1.5–8.1)
NRBC BLD-RTO: 0 PER 100 WBC
PLATELET # BLD AUTO: 243 K/UL (ref 138–453)
PMV BLD AUTO: 12.6 FL (ref 8.1–13.5)
POTASSIUM SERPL-SCNC: 4.2 MMOL/L (ref 3.7–5.3)
PROT SERPL-MCNC: 6.5 G/DL (ref 6.4–8.3)
RBC # BLD AUTO: 4.28 M/UL (ref 3.95–5.11)
SODIUM SERPL-SCNC: 136 MMOL/L (ref 135–144)
WBC OTHER # BLD: 19.4 K/UL (ref 3.5–11.3)

## 2024-08-15 PROCEDURE — 6370000000 HC RX 637 (ALT 250 FOR IP): Performed by: NURSE PRACTITIONER

## 2024-08-15 PROCEDURE — 93010 ELECTROCARDIOGRAM REPORT: CPT | Performed by: FAMILY MEDICINE

## 2024-08-15 PROCEDURE — 80053 COMPREHEN METABOLIC PANEL: CPT

## 2024-08-15 PROCEDURE — 6360000002 HC RX W HCPCS: Performed by: NURSE PRACTITIONER

## 2024-08-15 PROCEDURE — 94761 N-INVAS EAR/PLS OXIMETRY MLT: CPT

## 2024-08-15 PROCEDURE — 97166 OT EVAL MOD COMPLEX 45 MIN: CPT

## 2024-08-15 PROCEDURE — 36415 COLL VENOUS BLD VENIPUNCTURE: CPT

## 2024-08-15 PROCEDURE — 2580000003 HC RX 258: Performed by: SURGERY

## 2024-08-15 PROCEDURE — 6360000002 HC RX W HCPCS: Performed by: SURGERY

## 2024-08-15 PROCEDURE — 2700000000 HC OXYGEN THERAPY PER DAY

## 2024-08-15 PROCEDURE — 2580000003 HC RX 258: Performed by: NURSE PRACTITIONER

## 2024-08-15 PROCEDURE — 85025 COMPLETE CBC W/AUTO DIFF WBC: CPT

## 2024-08-15 PROCEDURE — 1200000000 HC SEMI PRIVATE

## 2024-08-15 PROCEDURE — 82947 ASSAY GLUCOSE BLOOD QUANT: CPT

## 2024-08-15 RX ADMIN — SODIUM CHLORIDE: 9 INJECTION, SOLUTION INTRAVENOUS at 12:00

## 2024-08-15 RX ADMIN — MORPHINE SULFATE 2 MG: 2 INJECTION, SOLUTION INTRAMUSCULAR; INTRAVENOUS at 03:03

## 2024-08-15 RX ADMIN — SODIUM CHLORIDE: 9 INJECTION, SOLUTION INTRAVENOUS at 02:57

## 2024-08-15 RX ADMIN — SODIUM CHLORIDE: 9 INJECTION, SOLUTION INTRAVENOUS at 22:00

## 2024-08-15 RX ADMIN — PIPERACILLIN AND TAZOBACTAM 3375 MG: 3; .375 INJECTION, POWDER, LYOPHILIZED, FOR SOLUTION INTRAVENOUS at 05:22

## 2024-08-15 RX ADMIN — ACETAMINOPHEN 650 MG: 325 TABLET ORAL at 10:53

## 2024-08-15 RX ADMIN — INSULIN LISPRO 6 UNITS: 100 INJECTION, SOLUTION INTRAVENOUS; SUBCUTANEOUS at 07:56

## 2024-08-15 RX ADMIN — MORPHINE SULFATE 2 MG: 2 INJECTION, SOLUTION INTRAMUSCULAR; INTRAVENOUS at 11:58

## 2024-08-15 RX ADMIN — MORPHINE SULFATE 2 MG: 2 INJECTION, SOLUTION INTRAMUSCULAR; INTRAVENOUS at 18:26

## 2024-08-15 RX ADMIN — MORPHINE SULFATE 2 MG: 2 INJECTION, SOLUTION INTRAMUSCULAR; INTRAVENOUS at 06:47

## 2024-08-15 RX ADMIN — PIPERACILLIN AND TAZOBACTAM 3375 MG: 3; .375 INJECTION, POWDER, LYOPHILIZED, FOR SOLUTION INTRAVENOUS at 13:00

## 2024-08-15 RX ADMIN — INSULIN LISPRO 2 UNITS: 100 INJECTION, SOLUTION INTRAVENOUS; SUBCUTANEOUS at 11:54

## 2024-08-15 RX ADMIN — SODIUM CHLORIDE, PRESERVATIVE FREE 10 ML: 5 INJECTION INTRAVENOUS at 07:56

## 2024-08-15 RX ADMIN — PIPERACILLIN AND TAZOBACTAM 3375 MG: 3; .375 INJECTION, POWDER, LYOPHILIZED, FOR SOLUTION INTRAVENOUS at 19:03

## 2024-08-15 ASSESSMENT — PAIN - FUNCTIONAL ASSESSMENT
PAIN_FUNCTIONAL_ASSESSMENT: ACTIVITIES ARE NOT PREVENTED

## 2024-08-15 ASSESSMENT — PAIN DESCRIPTION - LOCATION
LOCATION: ABDOMEN
LOCATION: HEAD
LOCATION: ABDOMEN

## 2024-08-15 ASSESSMENT — PAIN DESCRIPTION - PAIN TYPE: TYPE: ACUTE PAIN

## 2024-08-15 ASSESSMENT — PAIN DESCRIPTION - DESCRIPTORS
DESCRIPTORS: ACHING;PRESSURE
DESCRIPTORS: ACHING
DESCRIPTORS: ACHING
DESCRIPTORS: STABBING
DESCRIPTORS: PRESSURE;SHARP

## 2024-08-15 ASSESSMENT — PAIN SCALES - GENERAL
PAINLEVEL_OUTOF10: 5
PAINLEVEL_OUTOF10: 6
PAINLEVEL_OUTOF10: 6
PAINLEVEL_OUTOF10: 10
PAINLEVEL_OUTOF10: 6
PAINLEVEL_OUTOF10: 8

## 2024-08-15 ASSESSMENT — PAIN DESCRIPTION - ORIENTATION
ORIENTATION: MID
ORIENTATION: RIGHT
ORIENTATION: LEFT
ORIENTATION: LOWER;RIGHT

## 2024-08-15 NOTE — PROGRESS NOTES
[facility]  Inpatient/Observation/Outpatient Rehabilitation    Date: 8/15/2024  Patient Name: Christina Sosa       [x] Inpatient Acute/Observation      : 1960     [x] Pt refused/declined therapy at this time due to:        Pt reports she is not feeling well and would like therapy to check back following her surgery tomorrow.        Therapist/Assistant will attempt to see this patient, at our earliest opportunity.       Darinel Csatro, PT, DPT, OCS, Cert. DN  Date: 8/15/2024

## 2024-08-15 NOTE — PROGRESS NOTES
Assessment and vitals obtained. Patient alert and oriented. Patient more alert today compared to yesterday. Complaining of abdominal pain 6/10. PRN morphine administered. Denying chest pain and shortness of breath. Currently on 1L of oxygen via nasal cannula. Denying recent nausea and vomiting. Bed alarm on and call light within reach.

## 2024-08-15 NOTE — PROGRESS NOTES
[facility]  Inpatient/Observation/Outpatient Rehabilitation    Date: 8/15/2024  Patient Name: Christina Sosa       [x] Inpatient Acute/Observation       []  Outpatient  : 1960           [] Pt no showed for scheduled appointment    [] As a reminder, pt was contacted/attempted contact via phone of upcoming appointments.    [x] Pt refused/declined therapy at this time due to:  Fatigue. Agreed to get up with PT this afternoon.          [] Pt cancelled due to:  [] No Reason Given   [] Sick/ill   [] Other:    [] Evaluation held by RN/Provider due to:    [] High Heart Rate   [] High Blood Pressure   [] Orthopedic Consult   [] Hgb < 7   [] Other:    [] Pt does not require skilled services due to:      Therapist/Assistant will attempt to see this patient, at our earliest opportunity.       Todd Cuevas, PT, DPT Date: 8/15/2024

## 2024-08-15 NOTE — CARE COORDINATION
Case Management Assessment  Initial Evaluation    Date/Time of Evaluation: 8/15/2024 12:22 PM  Assessment Completed by: MEI Bahena    If patient is discharged prior to next notation, then this note serves as note for discharge by case management.    Patient Name: Christina Silveira                   YOB: 1960  Diagnosis: Acute cholecystitis [K81.0]                   Date / Time: 8/13/2024 11:21 PM    Patient Admission Status: Inpatient   Readmission Risk (Low < 19, Mod (19-27), High > 27): Readmission Risk Score: 11.4    Current PCP: Kb Ayala MD  PCP verified by CM? Yes    Chart Reviewed: Yes      History Provided by: Patient  Patient Orientation: Alert and Oriented, Person, Place, Situation, Self    Patient Cognition: Alert    Hospitalization in the last 30 days (Readmission):  No    If yes, Readmission Assessment in  Navigator will be completed.    Advance Directives:      Code Status: Full Code   Patient's Primary Decision Maker is: Legal Next of Kin    Primary Decision Maker: WILLIE SILVEIRA - Spouse - [phone]    Discharge Planning:    Patient lives with: Spouse/Significant Other Type of Home: House  Primary Care Giver: Self  Patient Support Systems include: Spouse/Significant Other, Family Members   Current Financial resources: Medicare  Current community resources: None  Current services prior to admission: None            Current DME:              Type of Home Care services:  None    ADLS  Prior functional level: Independent in ADLs/IADLs  Current functional level: Independent in ADLs/IADLs    PT AM-PAC:   /24  OT AM-PAC:   /24    Family can provide assistance at DC: Yes  Would you like Case Management to discuss the discharge plan with any other family members/significant others, and if so, who? Yes  Plans to Return to Present Housing: Yes  Other Identified Issues/Barriers to RETURNING to current housing: none  Potential Assistance needed at discharge: N/A

## 2024-08-15 NOTE — PROGRESS NOTES
Patients daughter and grand daughter in room with patient. Writer provided them with update of patients care and what all has happened until now. Patients family upset stating that they were called and were told that patient had a heart attack and was admitted for that reason. Writer assured family that patient did not have a heart attack and that she is here for her gallbladder. Family states they wish they would have known she was here for her gallbladder because they drove so far to get here. All questions answered. Plan of care ongoing.

## 2024-08-15 NOTE — PROGRESS NOTES
Occupational Therapy  Facility/Department: [facility] MED SURG  Occupational Therapy Initial Assessment    Name: Christina Sosa  : 1960  MRN: [id number]  Date of Service: 8/15/2024    Discharge Recommendations:  Continue to assess pending progress, Home with assist PRN          Patient Diagnosis(es):  Past Medical History:  has a past medical history of Anxiety, Asthma, Carpal tunnel syndrome, Diabetes mellitus (HCC), Glaucoma, Headache(784.0), Hyperlipidemia, Hypertension, Osteoarthritis, Rotator cuff tendinitis, and Type 2 diabetes mellitus (HCC).  Past Surgical History:  has a past surgical history that includes Eye surgery; Hand surgery (Bilateral); Foot surgery (Bilateral); Foot surgery (Bilateral); Tubal ligation; Appendectomy; cyst removal (Right); other surgical history; Finger trigger release (Right, ); and Carpal tunnel release (Left, ).    Treatment Diagnosis: weakness      Assessment   Performance deficits / Impairments: Decreased functional mobility ;Decreased ADL status;Decreased strength;Decreased high-level IADLs;Decreased endurance;Decreased coordination  Assessment: 64 year old female admitted for acute cholecystitis. Patient presents with decreased strength and increased pain causing a decrease in independence in I/ADL routine. Patient would benefit from OT services to increase strength to increase independence in I/ADL routine.  Treatment Diagnosis: weakness  Prognosis: Good  Decision Making: Medium Complexity  REQUIRES OT FOLLOW-UP: Yes        Plan   Occupational Therapy Plan  Times Per Day: Once a day  Days Per Week: 7 Days  Current Treatment Recommendations: Strengthening, Balance training, Functional mobility training, Endurance training, Equipment evaluation, education, & procurement, Patient/Caregiver education & training, Safety education & training, Self-Care / ADL     Restrictions  Restrictions/Precautions  Restrictions/Precautions: General Precautions, Fall

## 2024-08-15 NOTE — PROGRESS NOTES
Progress Note    SUBJECTIVE:  FU related to surgery canceled due to mental confusion.  No issues overnight.  Back to baseline.    OBJECTIVE:    Vitals:   TEMPERATURE:  Current - Temp: 98.2 °F (36.8 °C); Max - Temp  Av.9 °F (36.6 °C)  Min: 97.5 °F (36.4 °C)  Max: 98.2 °F (36.8 °C)  RESPIRATIONS RANGE: Resp  Av.8  Min: 14  Max: 22  PULSE RANGE: Pulse  Av.6  Min: 72  Max: 86  BLOOD PRESSURE RANGE:  Systolic (24hrs), Av , Min:133 , Max:211   ; Diastolic (24hrs), Av, Min:53, Max:82    PULSE OXIMETRY RANGE: SpO2  Av.9 %  Min: 87 %  Max: 98 %  24HR INTAKE/OUTPUT:    Intake/Output Summary (Last 24 hours) at 8/15/2024 0702  Last data filed at 8/15/2024 0522  Gross per 24 hour   Intake 2849.8 ml   Output 750 ml   Net 2099.8 ml     -----------------------------------------------------------------  Exam:  General: A & O x3 and alert  HEENT: Supple neck & negative  Heart: Regular  Lungs: clear to auscultation bilaterally & no retractions  Abdomen:  in the abdomen epigastric and upper quadrant.  Right side predominant  Extremities:  No edema   Neuro: NonFocal     -----------------------------------------------------------------  Diagnostic Data:  Lab Results   Component Value Date    WBC 19.4 (H) 08/15/2024    HGB 13.3 08/15/2024     08/15/2024       Lab Results   Component Value Date    BUN 12 2024    CREATININE 0.8 2024     (L) 2024    K 4.1 2024    CALCIUM 9.0 2024    CL 96 (L) 2024    CO2 25 2024    LABGLOM 81 2024       Lab Results   Component Value Date    WBCUA None 2024    RBCUA None 2024    LEUKOCYTESUR NEGATIVE 2024    GLUCOSEU NEGATIVE 2024    KETUA TRACE (A) 2024    PROTEINU TRACE (A) 2024    HGBUR NEGATIVE 2024    CASTUA NOT REPORTED 2016    BACTERIA NOT REPORTED 2016    YEAST NOT REPORTED 2016       No results found for: \"MYOGLOBIN\", \"TROPONINT\", \"CKTOTAL\",  There is no evidence of inguinal or pelvic lymphadenopathy. Retroperitoneum: Abdominal aortic caliber is within normal range.  Aorta is atherosclerotic.  No retroperitoneal adenopathy. Bones and soft tissues: No aggressive lytic or blastic bony lesion.     Chest: 1. No evidence of pulmonary embolus or acute pulmonary abnormality. 2. Coronary artery atherosclerosis. Abdomen and pelvis: 1. Cholelithiasis with gallbladder wall thickening and pericholecystic fluid. This is suspicious for acute cholecystitis. 2. Fibroid uterus.     CT ABDOMEN PELVIS W IV CONTRAST Additional Contrast? None    Result Date: 8/14/2024  EXAMINATION: CTA OF THE CHEST; CT OF THE ABDOMEN AND PELVIS WITH CONTRAST 8/14/2024 2:08 am TECHNIQUE: CTA of the chest was performed after the administration of intravenous contrast.  Multiplanar reformatted images are provided for review.  MIP images are provided for review. Automated exposure control, iterative reconstruction, and/or weight based adjustment of the mA/kV was utilized to reduce the radiation dose to as low as reasonably achievable.; CT of the abdomen and pelvis was performed with the administration of intravenous contrast. Multiplanar reformatted images are provided for review. Automated exposure control, iterative reconstruction, and/or weight based adjustment of the mA/kV was utilized to reduce the radiation dose to as low as reasonably achievable. COMPARISON: February 21, 2016 HISTORY: ORDERING SYSTEM PROVIDED HISTORY: Hypoxia leukocytosis TECHNOLOGIST PROVIDED HISTORY: Hypoxia leukocytosis Additional Contrast?->1 FINDINGS: CT chest: Pulmonary Arteries: Pulmonary arteries are adequately opacified for evaluation.  No evidence of intraluminal filling defect to suggest pulmonary embolism.  Main pulmonary artery is normal in caliber. Mediastinum: Heart size is normal.  No pericardial effusion.  Coronary artery atherosclerosis.  No hilar or mediastinal adenopathy. Lungs/pleura: The central

## 2024-08-15 NOTE — PROGRESS NOTES
Spiritual Services Interventions  0318/0318-01   8/15/2024  Dennis Sosa  64 y.o. year old female    Encounter Summary  Encounter Overview/Reason: (P) Initial Encounter  Service Provided For: (P) Patient  Referral/Consult From: (P) Rounding  Last Encounter : (P) 08/15/24  Complexity of Encounter: (P) Moderate  Begin Time: (P) 0925  End Time : (P) 0930  Total Time Calculated: (P) 5 min     Spiritual/Emotional needs  Type: (P) Spiritual Support                    Assessment/Intervention/Outcome  Assessment: (P) Calm  Intervention: (P) Discussed belief system/Rastafari practices/brent, Discussed illness injury and it’s impact  Outcome: (P) Encouraged, Engaged in conversation

## 2024-08-15 NOTE — PROGRESS NOTES
Comprehensive Nutrition Assessment    Type and Reason for Visit:  Initial, Patient Education    Nutrition Recommendations/Plan:   Low fat, carb controlled diet post op goal.   Education materials on low fat provided.      Malnutrition Assessment:  Malnutrition Status:  No malnutrition (08/15/24 0918)    Context:  Acute Illness     Findings of the 6 clinical characteristics of malnutrition:  Energy Intake:  Mild decrease in energy intake (Comment) (NPO)  Weight Loss:  No significant weight loss     Body Fat Loss:  No significant body fat loss     Muscle Mass Loss:  No significant muscle mass loss    Fluid Accumulation:  No significant fluid accumulation     Strength:  Not Performed    Nutrition Assessment:    Inadequate nutrient intakes r/t altered GI status, AEB NPO for pending procedure (cholecystectomy). Uncomfortable in bed and not very animated during visit. Provided with low fat eating strategy information for post op diet progression which she can review when feeling better. Fair usual control of diabetes per A1C. States a limited diet at home with yogurt, cottage cheese and sausage r/t lack of teeth. Advised that sausage may not be well tolerated post op. Prefers to rest at this time.    Nutrition Related Findings:    obese, active b/s. no edema. Wound Type: None       Current Nutrition Intake & Therapies:    Average Meal Intake: NPO  Average Supplements Intake: NPO  Diet NPO Exceptions are: Ice Chips    Anthropometric Measures:  Height: 156.2 cm (5' 1.5\")  Ideal Body Weight (IBW): 108 lbs (49 kg)    Admission Body Weight: 87.3 kg (192 lb 6.4 oz)  Current Body Weight: 87.3 kg (192 lb 6.4 oz), 178.1 % IBW. Weight Source: Bed Scale  Current BMI (kg/m2): 35.8  Usual Body Weight: 89.4 kg (197 lb) (in march)  % Weight Change (Calculated): -2.3  Weight Adjustment For: No Adjustment                 BMI Categories: Obese Class 2 (BMI 35.0 -39.9)    Estimated Daily Nutrient Needs:  Energy Requirements Based On:  Kcal/kg  Weight Used for Energy Requirements: Current  Energy (kcal/day): 5541-8604 (15-18)  Weight Used for Protein Requirements: Ideal  Protein (g/day): 59-68 (1.2-1.4)  Method Used for Fluid Requirements: 1 ml/kcal  Fluid (ml/day): 1600    Nutrition Diagnosis:   Inadequate protein-energy intake related to altered GI function as evidenced by NPO or clear liquid status due to medical condition    Lab Results   Component Value Date     08/15/2024    K 4.2 08/15/2024     08/15/2024    CO2 25 08/15/2024    BUN 7 (L) 08/15/2024    CREATININE 0.6 08/15/2024    GLUCOSE 291 (H) 08/15/2024    CALCIUM 8.4 (L) 08/15/2024    BILITOT 0.6 08/15/2024    ALKPHOS 116 (H) 08/15/2024    AST 9 08/15/2024    ALT 6 08/15/2024    LABGLOM >90 08/15/2024    GFRAA >60 02/21/2016     Hemoglobin A1C   Date Value Ref Range Status   08/13/2024 7.8 (H) 4.0 - 6.0 % Final     No results found for: \"VITD25\"    Nutrition Interventions:   Food and/or Nutrient Delivery: Start Oral Diet  Nutrition Education/Counseling: Education initiated  Coordination of Nutrition Care: Continue to monitor while inpatient  Plan of Care discussed with: patient    Goals:     Goals: Meet at least 75% of estimated needs       Nutrition Monitoring and Evaluation:   Behavioral-Environmental Outcomes: Knowledge or Skill  Food/Nutrient Intake Outcomes: Diet Advancement/Tolerance  Physical Signs/Symptoms Outcomes: Biochemical Data, Weight    Discharge Planning:    No discharge needs at this time     Irvin Orozco RD, LD  Contact: [phone]

## 2024-08-15 NOTE — PROGRESS NOTES
Patient shift assessment and vitals completed at this time as charted. Writer asked patient orientation questions and patient states it is 1967 and she is at [facility] and she doesn't know why she is here. Writer educated patient on time, location and situation. Patient replies with \"gosh I know I'm just being a smart ass but I still don't understand why I'm here.\" Writer explained to patient her situation and patient states that she just wants to have water. Writer provided education for patient.  Patient denies chest pain, nausea and vomiting. Patient states no needs, call light is in reach, plan of care is ongoing.

## 2024-08-16 ENCOUNTER — ANESTHESIA (OUTPATIENT)
Dept: OPERATING ROOM | Age: 64
End: 2024-08-16
Payer: MEDICARE

## 2024-08-16 LAB
ALBUMIN SERPL-MCNC: 2.9 G/DL (ref 3.5–5.2)
ALBUMIN/GLOB SERPL: 0.8 {RATIO} (ref 1–2.5)
ALP SERPL-CCNC: 124 U/L (ref 35–104)
ALT SERPL-CCNC: 5 U/L (ref 5–33)
ANION GAP SERPL CALCULATED.3IONS-SCNC: 11 MMOL/L (ref 9–17)
ANION GAP SERPL CALCULATED.3IONS-SCNC: 8 MMOL/L (ref 9–17)
AST SERPL-CCNC: 12 U/L
BASOPHILS # BLD: 0.03 K/UL (ref 0–0.2)
BASOPHILS NFR BLD: 0 % (ref 0–2)
BILIRUB SERPL-MCNC: 0.9 MG/DL (ref 0.3–1.2)
BUN SERPL-MCNC: 8 MG/DL (ref 8–23)
BUN SERPL-MCNC: 8 MG/DL (ref 8–23)
BUN/CREAT SERPL: 13 (ref 9–20)
BUN/CREAT SERPL: 16 (ref 9–20)
CALCIUM SERPL-MCNC: 7.7 MG/DL (ref 8.6–10.4)
CALCIUM SERPL-MCNC: 7.9 MG/DL (ref 8.6–10.4)
CHLORIDE SERPL-SCNC: 102 MMOL/L (ref 98–107)
CHLORIDE SERPL-SCNC: 97 MMOL/L (ref 98–107)
CO2 SERPL-SCNC: 22 MMOL/L (ref 20–31)
CO2 SERPL-SCNC: 24 MMOL/L (ref 20–31)
CREAT SERPL-MCNC: 0.5 MG/DL (ref 0.5–0.9)
CREAT SERPL-MCNC: 0.6 MG/DL (ref 0.5–0.9)
EOSINOPHIL # BLD: <0.03 K/UL (ref 0–0.44)
EOSINOPHILS RELATIVE PERCENT: 0 % (ref 1–4)
ERYTHROCYTE [DISTWIDTH] IN BLOOD BY AUTOMATED COUNT: 16.5 % (ref 11.8–14.4)
GFR, ESTIMATED: >90 ML/MIN/1.73M2
GFR, ESTIMATED: >90 ML/MIN/1.73M2
GLUCOSE BLD-MCNC: 211 MG/DL (ref 74–100)
GLUCOSE BLD-MCNC: 223 MG/DL (ref 74–100)
GLUCOSE BLD-MCNC: 233 MG/DL (ref 74–100)
GLUCOSE BLD-MCNC: 236 MG/DL (ref 74–100)
GLUCOSE BLD-MCNC: 254 MG/DL (ref 74–100)
GLUCOSE BLD-MCNC: 268 MG/DL (ref 74–100)
GLUCOSE SERPL-MCNC: 221 MG/DL (ref 70–99)
GLUCOSE SERPL-MCNC: 261 MG/DL (ref 70–99)
HCT VFR BLD AUTO: 36.5 % (ref 36.3–47.1)
HGB BLD-MCNC: 12.1 G/DL (ref 11.9–15.1)
IMM GRANULOCYTES # BLD AUTO: 0.16 K/UL (ref 0–0.3)
IMM GRANULOCYTES NFR BLD: 1 %
LYMPHOCYTES NFR BLD: 1.06 K/UL (ref 1.1–3.7)
LYMPHOCYTES RELATIVE PERCENT: 6 % (ref 24–43)
MCH RBC QN AUTO: 30.7 PG (ref 25.2–33.5)
MCHC RBC AUTO-ENTMCNC: 33.2 G/DL (ref 28.4–34.8)
MCV RBC AUTO: 92.6 FL (ref 82.6–102.9)
MONOCYTES NFR BLD: 1.02 K/UL (ref 0.1–1.2)
MONOCYTES NFR BLD: 6 % (ref 3–12)
NEUTROPHILS NFR BLD: 87 % (ref 36–65)
NEUTS SEG NFR BLD: 14.4 K/UL (ref 1.5–8.1)
NRBC BLD-RTO: 0 PER 100 WBC
PLATELET # BLD AUTO: 190 K/UL (ref 138–453)
PMV BLD AUTO: 13.1 FL (ref 8.1–13.5)
POTASSIUM SERPL-SCNC: 3.5 MMOL/L (ref 3.7–5.3)
POTASSIUM SERPL-SCNC: 3.7 MMOL/L (ref 3.7–5.3)
PROT SERPL-MCNC: 6.5 G/DL (ref 6.4–8.3)
RBC # BLD AUTO: 3.94 M/UL (ref 3.95–5.11)
SODIUM SERPL-SCNC: 130 MMOL/L (ref 135–144)
SODIUM SERPL-SCNC: 134 MMOL/L (ref 135–144)
WBC OTHER # BLD: 16.7 K/UL (ref 3.5–11.3)

## 2024-08-16 PROCEDURE — 36415 COLL VENOUS BLD VENIPUNCTURE: CPT

## 2024-08-16 PROCEDURE — 2500000003 HC RX 250 WO HCPCS

## 2024-08-16 PROCEDURE — 1200000000 HC SEMI PRIVATE

## 2024-08-16 PROCEDURE — 3700000001 HC ADD 15 MINUTES (ANESTHESIA): Performed by: SURGERY

## 2024-08-16 PROCEDURE — 2500000003 HC RX 250 WO HCPCS: Performed by: NURSE ANESTHETIST, CERTIFIED REGISTERED

## 2024-08-16 PROCEDURE — 85025 COMPLETE CBC W/AUTO DIFF WBC: CPT

## 2024-08-16 PROCEDURE — 0FT44ZZ RESECTION OF GALLBLADDER, PERCUTANEOUS ENDOSCOPIC APPROACH: ICD-10-PCS | Performed by: SURGERY

## 2024-08-16 PROCEDURE — 7100000001 HC PACU RECOVERY - ADDTL 15 MIN: Performed by: SURGERY

## 2024-08-16 PROCEDURE — 6370000000 HC RX 637 (ALT 250 FOR IP): Performed by: NURSE PRACTITIONER

## 2024-08-16 PROCEDURE — 6360000002 HC RX W HCPCS: Performed by: NURSE PRACTITIONER

## 2024-08-16 PROCEDURE — 94761 N-INVAS EAR/PLS OXIMETRY MLT: CPT

## 2024-08-16 PROCEDURE — 87070 CULTURE OTHR SPECIMN AEROBIC: CPT

## 2024-08-16 PROCEDURE — 3600000019 HC SURGERY ROBOT ADDTL 15MIN: Performed by: SURGERY

## 2024-08-16 PROCEDURE — 2700000000 HC OXYGEN THERAPY PER DAY

## 2024-08-16 PROCEDURE — 6370000000 HC RX 637 (ALT 250 FOR IP): Performed by: NURSE ANESTHETIST, CERTIFIED REGISTERED

## 2024-08-16 PROCEDURE — 2580000003 HC RX 258: Performed by: SURGERY

## 2024-08-16 PROCEDURE — 6360000002 HC RX W HCPCS: Performed by: SURGERY

## 2024-08-16 PROCEDURE — 6370000000 HC RX 637 (ALT 250 FOR IP): Performed by: SURGERY

## 2024-08-16 PROCEDURE — 64488 TAP BLOCK BI INJECTION: CPT

## 2024-08-16 PROCEDURE — 6360000002 HC RX W HCPCS: Performed by: NURSE ANESTHETIST, CERTIFIED REGISTERED

## 2024-08-16 PROCEDURE — 88304 TISSUE EXAM BY PATHOLOGIST: CPT

## 2024-08-16 PROCEDURE — 2580000003 HC RX 258: Performed by: NURSE ANESTHETIST, CERTIFIED REGISTERED

## 2024-08-16 PROCEDURE — 80048 BASIC METABOLIC PNL TOTAL CA: CPT

## 2024-08-16 PROCEDURE — 82947 ASSAY GLUCOSE BLOOD QUANT: CPT

## 2024-08-16 PROCEDURE — S2900 ROBOTIC SURGICAL SYSTEM: HCPCS | Performed by: SURGERY

## 2024-08-16 PROCEDURE — 87077 CULTURE AEROBIC IDENTIFY: CPT

## 2024-08-16 PROCEDURE — 3600000009 HC SURGERY ROBOT BASE: Performed by: SURGERY

## 2024-08-16 PROCEDURE — 8E0W4CZ ROBOTIC ASSISTED PROCEDURE OF TRUNK REGION, PERCUTANEOUS ENDOSCOPIC APPROACH: ICD-10-PCS | Performed by: SURGERY

## 2024-08-16 PROCEDURE — 2720000010 HC SURG SUPPLY STERILE: Performed by: SURGERY

## 2024-08-16 PROCEDURE — 7100000000 HC PACU RECOVERY - FIRST 15 MIN: Performed by: SURGERY

## 2024-08-16 PROCEDURE — 2709999900 HC NON-CHARGEABLE SUPPLY: Performed by: SURGERY

## 2024-08-16 PROCEDURE — 2500000003 HC RX 250 WO HCPCS: Performed by: SURGERY

## 2024-08-16 PROCEDURE — 36416 COLLJ CAPILLARY BLOOD SPEC: CPT

## 2024-08-16 PROCEDURE — 80053 COMPREHEN METABOLIC PANEL: CPT

## 2024-08-16 PROCEDURE — 2580000003 HC RX 258: Performed by: NURSE PRACTITIONER

## 2024-08-16 PROCEDURE — 87075 CULTR BACTERIA EXCEPT BLOOD: CPT

## 2024-08-16 PROCEDURE — 3700000000 HC ANESTHESIA ATTENDED CARE: Performed by: SURGERY

## 2024-08-16 PROCEDURE — 87186 SC STD MICRODIL/AGAR DIL: CPT

## 2024-08-16 PROCEDURE — C1889 IMPLANT/INSERT DEVICE, NOC: HCPCS | Performed by: SURGERY

## 2024-08-16 PROCEDURE — 87205 SMEAR GRAM STAIN: CPT

## 2024-08-16 PROCEDURE — 6360000002 HC RX W HCPCS

## 2024-08-16 DEVICE — CLIP INT L POLYMER LOK LIG HEM O LOK (6EA/PK): Type: IMPLANTABLE DEVICE | Site: GALLBLADDER | Status: FUNCTIONAL

## 2024-08-16 RX ORDER — CEFAZOLIN SODIUM IN 0.9 % NACL 2 G/100 ML
PLASTIC BAG, INJECTION (ML) INTRAVENOUS PRN
Status: DISCONTINUED | OUTPATIENT
Start: 2024-08-16 | End: 2024-08-16 | Stop reason: SDUPTHER

## 2024-08-16 RX ORDER — HYDROCODONE BITARTRATE AND ACETAMINOPHEN 5; 325 MG/1; MG/1
1 TABLET ORAL EVERY 6 HOURS PRN
Status: DISCONTINUED | OUTPATIENT
Start: 2024-08-16 | End: 2024-08-18 | Stop reason: HOSPADM

## 2024-08-16 RX ORDER — ALBUTEROL SULFATE 90 UG/1
AEROSOL, METERED RESPIRATORY (INHALATION) PRN
Status: DISCONTINUED | OUTPATIENT
Start: 2024-08-16 | End: 2024-08-16 | Stop reason: SDUPTHER

## 2024-08-16 RX ORDER — FENTANYL CITRATE 50 UG/ML
50 INJECTION, SOLUTION INTRAMUSCULAR; INTRAVENOUS EVERY 5 MIN PRN
Status: DISCONTINUED | OUTPATIENT
Start: 2024-08-16 | End: 2024-08-16 | Stop reason: HOSPADM

## 2024-08-16 RX ORDER — SODIUM CHLORIDE 0.9 % (FLUSH) 0.9 %
5-40 SYRINGE (ML) INJECTION PRN
Status: DISCONTINUED | OUTPATIENT
Start: 2024-08-16 | End: 2024-08-16 | Stop reason: HOSPADM

## 2024-08-16 RX ORDER — INDOCYANINE GREEN AND WATER 25 MG
5 KIT INJECTION ONCE
Status: COMPLETED | OUTPATIENT
Start: 2024-08-16 | End: 2024-08-16

## 2024-08-16 RX ORDER — METOCLOPRAMIDE HYDROCHLORIDE 5 MG/ML
10 INJECTION INTRAMUSCULAR; INTRAVENOUS EVERY 6 HOURS PRN
Status: DISCONTINUED | OUTPATIENT
Start: 2024-08-16 | End: 2024-08-16 | Stop reason: HOSPADM

## 2024-08-16 RX ORDER — PROPOFOL 10 MG/ML
INJECTION, EMULSION INTRAVENOUS PRN
Status: DISCONTINUED | OUTPATIENT
Start: 2024-08-16 | End: 2024-08-16 | Stop reason: SDUPTHER

## 2024-08-16 RX ORDER — LIDOCAINE HYDROCHLORIDE 20 MG/ML
INJECTION, SOLUTION EPIDURAL; INFILTRATION; INTRACAUDAL; PERINEURAL PRN
Status: DISCONTINUED | OUTPATIENT
Start: 2024-08-16 | End: 2024-08-16 | Stop reason: SDUPTHER

## 2024-08-16 RX ORDER — DEXAMETHASONE SODIUM PHOSPHATE 4 MG/ML
INJECTION, SOLUTION INTRA-ARTICULAR; INTRALESIONAL; INTRAMUSCULAR; INTRAVENOUS; SOFT TISSUE PRN
Status: DISCONTINUED | OUTPATIENT
Start: 2024-08-16 | End: 2024-08-16 | Stop reason: SDUPTHER

## 2024-08-16 RX ORDER — ROCURONIUM BROMIDE 10 MG/ML
INJECTION, SOLUTION INTRAVENOUS PRN
Status: DISCONTINUED | OUTPATIENT
Start: 2024-08-16 | End: 2024-08-16 | Stop reason: SDUPTHER

## 2024-08-16 RX ORDER — FENTANYL CITRATE 50 UG/ML
INJECTION, SOLUTION INTRAMUSCULAR; INTRAVENOUS PRN
Status: DISCONTINUED | OUTPATIENT
Start: 2024-08-16 | End: 2024-08-16 | Stop reason: SDUPTHER

## 2024-08-16 RX ORDER — ROPIVACAINE HYDROCHLORIDE 5 MG/ML
INJECTION, SOLUTION EPIDURAL; INFILTRATION; PERINEURAL PRN
Status: DISCONTINUED | OUTPATIENT
Start: 2024-08-16 | End: 2024-08-16 | Stop reason: SDUPTHER

## 2024-08-16 RX ORDER — ONDANSETRON 2 MG/ML
INJECTION INTRAMUSCULAR; INTRAVENOUS PRN
Status: DISCONTINUED | OUTPATIENT
Start: 2024-08-16 | End: 2024-08-16 | Stop reason: SDUPTHER

## 2024-08-16 RX ORDER — SODIUM CHLORIDE 9 MG/ML
INJECTION, SOLUTION INTRAVENOUS CONTINUOUS PRN
Status: DISCONTINUED | OUTPATIENT
Start: 2024-08-16 | End: 2024-08-16 | Stop reason: SDUPTHER

## 2024-08-16 RX ORDER — SODIUM CHLORIDE 9 MG/ML
INJECTION, SOLUTION INTRAVENOUS PRN
Status: DISCONTINUED | OUTPATIENT
Start: 2024-08-16 | End: 2024-08-16 | Stop reason: HOSPADM

## 2024-08-16 RX ORDER — SODIUM CHLORIDE 0.9 % (FLUSH) 0.9 %
5-40 SYRINGE (ML) INJECTION EVERY 12 HOURS SCHEDULED
Status: DISCONTINUED | OUTPATIENT
Start: 2024-08-16 | End: 2024-08-16 | Stop reason: HOSPADM

## 2024-08-16 RX ORDER — KETOROLAC TROMETHAMINE 30 MG/ML
INJECTION, SOLUTION INTRAMUSCULAR; INTRAVENOUS PRN
Status: DISCONTINUED | OUTPATIENT
Start: 2024-08-16 | End: 2024-08-16 | Stop reason: SDUPTHER

## 2024-08-16 RX ORDER — MIDAZOLAM HYDROCHLORIDE 1 MG/ML
INJECTION INTRAMUSCULAR; INTRAVENOUS PRN
Status: DISCONTINUED | OUTPATIENT
Start: 2024-08-16 | End: 2024-08-16 | Stop reason: SDUPTHER

## 2024-08-16 RX ORDER — DEXAMETHASONE SODIUM PHOSPHATE 10 MG/ML
INJECTION, SOLUTION INTRAMUSCULAR; INTRAVENOUS PRN
Status: DISCONTINUED | OUTPATIENT
Start: 2024-08-16 | End: 2024-08-16 | Stop reason: SDUPTHER

## 2024-08-16 RX ORDER — CEFAZOLIN SODIUM IN 0.9 % NACL 2 G/100 ML
PLASTIC BAG, INJECTION (ML) INTRAVENOUS
Status: COMPLETED
Start: 2024-08-16 | End: 2024-08-16

## 2024-08-16 RX ORDER — DEXTROSE MONOHYDRATE 100 MG/ML
INJECTION, SOLUTION INTRAVENOUS CONTINUOUS PRN
Status: DISCONTINUED | OUTPATIENT
Start: 2024-08-16 | End: 2024-08-18 | Stop reason: HOSPADM

## 2024-08-16 RX ORDER — GLUCAGON 1 MG/ML
1 KIT INJECTION PRN
Status: DISCONTINUED | OUTPATIENT
Start: 2024-08-16 | End: 2024-08-18 | Stop reason: HOSPADM

## 2024-08-16 RX ORDER — SODIUM CHLORIDE 9 MG/ML
INJECTION, SOLUTION INTRAMUSCULAR; INTRAVENOUS; SUBCUTANEOUS PRN
Status: DISCONTINUED | OUTPATIENT
Start: 2024-08-16 | End: 2024-08-16 | Stop reason: SDUPTHER

## 2024-08-16 RX ADMIN — PIPERACILLIN AND TAZOBACTAM 3375 MG: 3; .375 INJECTION, POWDER, LYOPHILIZED, FOR SOLUTION INTRAVENOUS at 19:34

## 2024-08-16 RX ADMIN — SODIUM CHLORIDE: 9 INJECTION, SOLUTION INTRAVENOUS at 11:05

## 2024-08-16 RX ADMIN — ONDANSETRON 4 MG: 2 INJECTION INTRAMUSCULAR; INTRAVENOUS at 10:49

## 2024-08-16 RX ADMIN — FENTANYL CITRATE 50 MCG: 50 INJECTION INTRAMUSCULAR; INTRAVENOUS at 11:02

## 2024-08-16 RX ADMIN — KETOROLAC TROMETHAMINE 30 MG: 30 INJECTION, SOLUTION INTRAMUSCULAR at 12:10

## 2024-08-16 RX ADMIN — PIPERACILLIN AND TAZOBACTAM 3375 MG: 3; .375 INJECTION, POWDER, LYOPHILIZED, FOR SOLUTION INTRAVENOUS at 07:09

## 2024-08-16 RX ADMIN — INSULIN LISPRO 2 UNITS: 100 INJECTION, SOLUTION INTRAVENOUS; SUBCUTANEOUS at 16:22

## 2024-08-16 RX ADMIN — MIDAZOLAM 1 MG: 1 INJECTION INTRAMUSCULAR; INTRAVENOUS at 11:02

## 2024-08-16 RX ADMIN — SODIUM CHLORIDE, PRESERVATIVE FREE 10 ML: 5 INJECTION INTRAVENOUS at 07:36

## 2024-08-16 RX ADMIN — DEXAMETHASONE SODIUM PHOSPHATE 4 MG: 4 INJECTION INTRA-ARTICULAR; INTRALESIONAL; INTRAMUSCULAR; INTRAVENOUS; SOFT TISSUE at 10:49

## 2024-08-16 RX ADMIN — Medication 2000 MG: at 10:43

## 2024-08-16 RX ADMIN — LIDOCAINE HYDROCHLORIDE 5 ML: 20 INJECTION, SOLUTION EPIDURAL; INFILTRATION; INTRACAUDAL; PERINEURAL at 10:25

## 2024-08-16 RX ADMIN — POTASSIUM CHLORIDE 40 MEQ: 1500 TABLET, EXTENDED RELEASE ORAL at 15:45

## 2024-08-16 RX ADMIN — DEXAMETHASONE SODIUM PHOSPHATE 10 MG: 10 INJECTION, SOLUTION INTRAMUSCULAR; INTRAVENOUS at 10:31

## 2024-08-16 RX ADMIN — PROPOFOL 100 MG: 10 INJECTION, EMULSION INTRAVENOUS at 10:25

## 2024-08-16 RX ADMIN — SODIUM CHLORIDE: 9 INJECTION, SOLUTION INTRAVENOUS at 07:06

## 2024-08-16 RX ADMIN — SODIUM CHLORIDE, PRESERVATIVE FREE 10 ML: 5 INJECTION INTRAVENOUS at 19:34

## 2024-08-16 RX ADMIN — PIPERACILLIN AND TAZOBACTAM 3375 MG: 3; .375 INJECTION, POWDER, LYOPHILIZED, FOR SOLUTION INTRAVENOUS at 14:39

## 2024-08-16 RX ADMIN — MIDAZOLAM 1 MG: 1 INJECTION INTRAMUSCULAR; INTRAVENOUS at 10:18

## 2024-08-16 RX ADMIN — ROCURONIUM BROMIDE 50 MG: 10 INJECTION, SOLUTION INTRAVENOUS at 10:26

## 2024-08-16 RX ADMIN — INDOCYANINE GREEN AND WATER 5 MG: KIT at 09:41

## 2024-08-16 RX ADMIN — MORPHINE SULFATE 2 MG: 2 INJECTION, SOLUTION INTRAMUSCULAR; INTRAVENOUS at 05:23

## 2024-08-16 RX ADMIN — ROPIVACAINE HYDROCHLORIDE 50 ML: 5 INJECTION EPIDURAL; INFILTRATION; PERINEURAL at 10:31

## 2024-08-16 RX ADMIN — SODIUM CHLORIDE 40 ML: 9 INJECTION INTRAMUSCULAR; INTRAVENOUS; SUBCUTANEOUS at 10:31

## 2024-08-16 RX ADMIN — HYDROCODONE BITARTRATE AND ACETAMINOPHEN 1 TABLET: 5; 325 TABLET ORAL at 15:01

## 2024-08-16 RX ADMIN — INSULIN HUMAN 10 UNITS: 100 INJECTION, SOLUTION PARENTERAL at 09:41

## 2024-08-16 RX ADMIN — FENTANYL CITRATE 50 MCG: 50 INJECTION INTRAMUSCULAR; INTRAVENOUS at 10:18

## 2024-08-16 RX ADMIN — SODIUM CHLORIDE: 9 INJECTION, SOLUTION INTRAVENOUS at 10:11

## 2024-08-16 RX ADMIN — MORPHINE SULFATE 2 MG: 2 INJECTION, SOLUTION INTRAMUSCULAR; INTRAVENOUS at 00:10

## 2024-08-16 RX ADMIN — MORPHINE SULFATE 4 MG: 4 INJECTION, SOLUTION INTRAMUSCULAR; INTRAVENOUS at 23:04

## 2024-08-16 RX ADMIN — ONDANSETRON 4 MG: 2 INJECTION INTRAMUSCULAR; INTRAVENOUS at 12:10

## 2024-08-16 RX ADMIN — INSULIN LISPRO 4 UNITS: 100 INJECTION, SOLUTION INTRAVENOUS; SUBCUTANEOUS at 07:35

## 2024-08-16 RX ADMIN — ALBUTEROL SULFATE 8 PUFF: 90 AEROSOL, METERED RESPIRATORY (INHALATION) at 10:29

## 2024-08-16 RX ADMIN — SUGAMMADEX 200 MG: 100 INJECTION, SOLUTION INTRAVENOUS at 12:10

## 2024-08-16 RX ADMIN — PIPERACILLIN AND TAZOBACTAM 3375 MG: 3; .375 INJECTION, POWDER, LYOPHILIZED, FOR SOLUTION INTRAVENOUS at 00:43

## 2024-08-16 ASSESSMENT — PAIN - FUNCTIONAL ASSESSMENT
PAIN_FUNCTIONAL_ASSESSMENT: FACE, LEGS, ACTIVITY, CRY, AND CONSOLABILITY (FLACC)
PAIN_FUNCTIONAL_ASSESSMENT: FACE, LEGS, ACTIVITY, CRY, AND CONSOLABILITY (FLACC)
PAIN_FUNCTIONAL_ASSESSMENT: 0-10
PAIN_FUNCTIONAL_ASSESSMENT: ACTIVITIES ARE NOT PREVENTED
PAIN_FUNCTIONAL_ASSESSMENT: FACE, LEGS, ACTIVITY, CRY, AND CONSOLABILITY (FLACC)
PAIN_FUNCTIONAL_ASSESSMENT: ACTIVITIES ARE NOT PREVENTED
PAIN_FUNCTIONAL_ASSESSMENT: FACE, LEGS, ACTIVITY, CRY, AND CONSOLABILITY (FLACC)
PAIN_FUNCTIONAL_ASSESSMENT: FACE, LEGS, ACTIVITY, CRY, AND CONSOLABILITY (FLACC)
PAIN_FUNCTIONAL_ASSESSMENT: ACTIVITIES ARE NOT PREVENTED
PAIN_FUNCTIONAL_ASSESSMENT: ACTIVITIES ARE NOT PREVENTED
PAIN_FUNCTIONAL_ASSESSMENT: FACE, LEGS, ACTIVITY, CRY, AND CONSOLABILITY (FLACC)
PAIN_FUNCTIONAL_ASSESSMENT: ACTIVITIES ARE NOT PREVENTED
PAIN_FUNCTIONAL_ASSESSMENT: FACE, LEGS, ACTIVITY, CRY, AND CONSOLABILITY (FLACC)

## 2024-08-16 ASSESSMENT — PAIN DESCRIPTION - FREQUENCY
FREQUENCY: CONTINUOUS

## 2024-08-16 ASSESSMENT — LIFESTYLE VARIABLES: SMOKING_STATUS: 1

## 2024-08-16 ASSESSMENT — PAIN DESCRIPTION - PAIN TYPE
TYPE: ACUTE PAIN
TYPE: ACUTE PAIN;SURGICAL PAIN
TYPE: ACUTE PAIN

## 2024-08-16 ASSESSMENT — PAIN DESCRIPTION - ORIENTATION
ORIENTATION: RIGHT;LOWER
ORIENTATION: MID
ORIENTATION: MID
ORIENTATION: RIGHT

## 2024-08-16 ASSESSMENT — PAIN SCALES - GENERAL
PAINLEVEL_OUTOF10: 7
PAINLEVEL_OUTOF10: 6
PAINLEVEL_OUTOF10: 3
PAINLEVEL_OUTOF10: 7
PAINLEVEL_OUTOF10: 10
PAINLEVEL_OUTOF10: 7

## 2024-08-16 ASSESSMENT — PAIN DESCRIPTION - DESCRIPTORS
DESCRIPTORS: ACHING
DESCRIPTORS: ACHING;SHARP
DESCRIPTORS: ACHING;SHARP

## 2024-08-16 ASSESSMENT — PAIN DESCRIPTION - ONSET
ONSET: ON-GOING

## 2024-08-16 ASSESSMENT — PAIN DESCRIPTION - LOCATION
LOCATION: ABDOMEN

## 2024-08-16 NOTE — PROGRESS NOTES
Patient returned from OR. Complaining of abdominal pain 6/10 and denying nausea. Patient drowsy. Incisions are clean, dry, and intact. DENISSE with serosanguinous output. Placed on 3L nasal cannula. Incentive spirometer provided. Call light within reach and bed alarm on.

## 2024-08-16 NOTE — PLAN OF CARE
Problem: Discharge Planning  Goal: Discharge to home or other facility with appropriate resources  Outcome: Progressing  Flowsheets (Taken 8/16/2024 0341)  Discharge to home or other facility with appropriate resources:   Identify barriers to discharge with patient and caregiver   Arrange for needed discharge resources and transportation as appropriate   Identify discharge learning needs (meds, wound care, etc)     Problem: Pain  Goal: Verbalizes/displays adequate comfort level or baseline comfort level  Outcome: Progressing  Flowsheets (Taken 8/16/2024 0341)  Verbalizes/displays adequate comfort level or baseline comfort level:   Encourage patient to monitor pain and request assistance   Assess pain using appropriate pain scale   Administer analgesics based on type and severity of pain and evaluate response   Implement non-pharmacological measures as appropriate and evaluate response   Notify Licensed Independent Practitioner if interventions unsuccessful or patient reports new pain     Problem: Safety - Adult  Goal: Free from fall injury  Outcome: Progressing  Flowsheets (Taken 8/16/2024 0341)  Free From Fall Injury: Instruct family/caregiver on patient safety     Problem: Chronic Conditions and Co-morbidities  Goal: Patient's chronic conditions and co-morbidity symptoms are monitored and maintained or improved  Outcome: Progressing  Flowsheets (Taken 8/16/2024 0341)  Care Plan - Patient's Chronic Conditions and Co-Morbidity Symptoms are Monitored and Maintained or Improved:   Monitor and assess patient's chronic conditions and comorbid symptoms for stability, deterioration, or improvement   Collaborate with multidisciplinary team to address chronic and comorbid conditions and prevent exacerbation or deterioration   Update acute care plan with appropriate goals if chronic or comorbid symptoms are exacerbated and prevent overall improvement and discharge     Problem: Skin/Tissue Integrity  Goal: Absence of new

## 2024-08-16 NOTE — PROGRESS NOTES
Progress Note    SUBJECTIVE: Follow-up related to surgery being canceled.  No mental confusion now.  Just tired.  Anxious to get surgery done.  Denies any vomiting.  Pain is reasonable.  She got some sleep last night.    OBJECTIVE:    Vitals:   TEMPERATURE:  Current - Temp: 98.6 °F (37 °C); Max - Temp  Av.4 °F (36.9 °C)  Min: 98.1 °F (36.7 °C)  Max: 98.6 °F (37 °C)  RESPIRATIONS RANGE: Resp  Av.2  Min: 16  Max: 18  PULSE RANGE: Pulse  Av.3  Min: 83  Max: 86  BLOOD PRESSURE RANGE:  Systolic (24hrs), Av , Min:108 , Max:130   ; Diastolic (24hrs), Av, Min:48, Max:52    PULSE OXIMETRY RANGE: SpO2  Av.5 %  Min: 91 %  Max: 95 %  24HR INTAKE/OUTPUT:    Intake/Output Summary (Last 24 hours) at 2024 0715  Last data filed at 2024 0518  Gross per 24 hour   Intake 3779.97 ml   Output 1000 ml   Net 2779.97 ml     -----------------------------------------------------------------  Exam:  General: A & O x3 and alert  HEENT: Supple neck & negative  Heart: Regular  Lungs: clear to auscultation bilaterally & no retractions  Abdomen:  in the abdomen epigastric and upper quadrant.  Right side predominant  Extremities:  No edema   Neuro: NonFocal     -----------------------------------------------------------------  Diagnostic Data:  Lab Results   Component Value Date    WBC 16.7 (H) 2024    HGB 12.1 2024     2024       Lab Results   Component Value Date    BUN 8 2024    CREATININE 0.6 2024     (L) 2024    K PENDING 2024    CALCIUM 7.9 (L) 2024    CL 97 (L) 2024    CO2 22 2024    LABGLOM >90 2024       Lab Results   Component Value Date    WBCUA None 2024    RBCUA None 2024    LEUKOCYTESUR NEGATIVE 2024    GLUCOSEU NEGATIVE 2024    KETUA TRACE (A) 2024    PROTEINU TRACE (A) 2024    HGBUR NEGATIVE 2024    CASTUA NOT REPORTED 2016    BACTERIA NOT REPORTED 2016  partially calcified uterus secondary to fibroids. Bladder is unremarkable.  No free fluid in the pelvis.  There is no evidence of inguinal or pelvic lymphadenopathy. Retroperitoneum: Abdominal aortic caliber is within normal range.  Aorta is atherosclerotic.  No retroperitoneal adenopathy. Bones and soft tissues: No aggressive lytic or blastic bony lesion.     Chest: 1. No evidence of pulmonary embolus or acute pulmonary abnormality. 2. Coronary artery atherosclerosis. Abdomen and pelvis: 1. Cholelithiasis with gallbladder wall thickening and pericholecystic fluid. This is suspicious for acute cholecystitis. 2. Fibroid uterus.     CT ABDOMEN PELVIS W IV CONTRAST Additional Contrast? None    Result Date: 8/14/2024  EXAMINATION: CTA OF THE CHEST; CT OF THE ABDOMEN AND PELVIS WITH CONTRAST 8/14/2024 2:08 am TECHNIQUE: CTA of the chest was performed after the administration of intravenous contrast.  Multiplanar reformatted images are provided for review.  MIP images are provided for review. Automated exposure control, iterative reconstruction, and/or weight based adjustment of the mA/kV was utilized to reduce the radiation dose to as low as reasonably achievable.; CT of the abdomen and pelvis was performed with the administration of intravenous contrast. Multiplanar reformatted images are provided for review. Automated exposure control, iterative reconstruction, and/or weight based adjustment of the mA/kV was utilized to reduce the radiation dose to as low as reasonably achievable. COMPARISON: February 21, 2016 HISTORY: ORDERING SYSTEM PROVIDED HISTORY: Hypoxia leukocytosis TECHNOLOGIST PROVIDED HISTORY: Hypoxia leukocytosis Additional Contrast?->1 FINDINGS: CT chest: Pulmonary Arteries: Pulmonary arteries are adequately opacified for evaluation.  No evidence of intraluminal filling defect to suggest pulmonary embolism.  Main pulmonary artery is normal in caliber. Mediastinum: Heart size is normal.  No pericardial

## 2024-08-16 NOTE — PROGRESS NOTES
Patient resting in bed with eyes closed and easily awakens to verbal stimuli. Patient is A&Ox4. Patient breathing is even and unlabored. Patient denies pain at this time. Call light is within reach. Care ongoing.

## 2024-08-16 NOTE — ANESTHESIA POSTPROCEDURE EVALUATION
Department of Anesthesiology  Postprocedure Note    Patient: Christina Sosa  MRN: [id number]  YOB: 1960  Date of evaluation: 8/16/2024    Procedure Summary       Date: 08/16/24 Room / Location: [address]    Anesthesia Start: 1016 Anesthesia Stop: 1225    Procedure: CHOLECYSTECTOMY LAPAROSCOPIC ROBOTIC Diagnosis:       Acute cholecystitis      (Acute cholecystitis [K81.0])    Surgeons: Herber Vieira MD Responsible Provider: Kaylen Mejia APRN - CRNA    Anesthesia Type: General ASA Status: 3            Anesthesia Type: General    Edwin Phase I: Edwin Score: 8    Edwin Phase II:      Anesthesia Post Evaluation    Patient location during evaluation: bedside  Patient participation: complete - patient participated  Level of consciousness: awake and alert  Pain score: 8  Airway patency: patent  Nausea & Vomiting: no nausea and no vomiting  Cardiovascular status: hemodynamically stable  Respiratory status: acceptable  Hydration status: stable  Pain management: adequate        No notable events documented.

## 2024-08-16 NOTE — PLAN OF CARE
Problem: Discharge Planning  Goal: Discharge to home or other facility with appropriate resources  8/16/2024 0922 by Genet Clark RN  Outcome: Progressing  8/16/2024 0341 by Erna Brasher RN  Outcome: Progressing  Flowsheets (Taken 8/16/2024 0341)  Discharge to home or other facility with appropriate resources:   Identify barriers to discharge with patient and caregiver   Arrange for needed discharge resources and transportation as appropriate   Identify discharge learning needs (meds, wound care, etc)     Problem: Pain  Goal: Verbalizes/displays adequate comfort level or baseline comfort level  8/16/2024 0922 by Genet Clark RN  Outcome: Progressing  8/16/2024 0341 by Erna Brasher RN  Outcome: Progressing  Flowsheets (Taken 8/16/2024 0341)  Verbalizes/displays adequate comfort level or baseline comfort level:   Encourage patient to monitor pain and request assistance   Assess pain using appropriate pain scale   Administer analgesics based on type and severity of pain and evaluate response   Implement non-pharmacological measures as appropriate and evaluate response   Notify Licensed Independent Practitioner if interventions unsuccessful or patient reports new pain     Problem: Safety - Adult  Goal: Free from fall injury  8/16/2024 0922 by Genet Clark RN  Outcome: Progressing  8/16/2024 0341 by Erna Brasher RN  Outcome: Progressing  Flowsheets (Taken 8/16/2024 0341)  Free From Fall Injury: Instruct family/caregiver on patient safety     Problem: Chronic Conditions and Co-morbidities  Goal: Patient's chronic conditions and co-morbidity symptoms are monitored and maintained or improved  8/16/2024 0922 by Genet Clark RN  Outcome: Progressing  8/16/2024 0341 by Erna Brasher RN  Outcome: Progressing  Flowsheets (Taken 8/16/2024 0341)  Care Plan - Patient's Chronic Conditions and Co-Morbidity Symptoms are Monitored and Maintained or Improved:   Monitor and assess patient's

## 2024-08-16 NOTE — CONSULTS
Patient scheduled today for robotic/laparoscopic cholecystectomy.  Began experience chest pain Tuesday.  Cardiac work up was okay, but imaging showed acute cholecystitis.      Patient now is complains of abdominal pan worse in RUQ.    Past Medical History:   Diagnosis Date    Anxiety     Asthma     Carpal tunnel syndrome     Diabetes mellitus (HCC)     Glaucoma     Headache(784.0)     Hyperlipidemia     Hypertension     Osteoarthritis     Rotator cuff tendinitis     Left/ some frozen shoulder/ declines surgery    Type 2 diabetes mellitus (HCC)      Past Surgical History:   Procedure Laterality Date    APPENDECTOMY      CARPAL TUNNEL RELEASE Left 2009    CYST REMOVAL Right     arm    EYE SURGERY      FINGER TRIGGER RELEASE Right 2013    Thumb     FOOT SURGERY Bilateral     FOOT SURGERY Bilateral     2 rt 1 lt    HAND SURGERY Bilateral     OTHER SURGICAL HISTORY      nerve transplantation lt arm    TUBAL LIGATION       Current Facility-Administered Medications   Medication Dose Route Frequency Provider Last Rate Last Admin    piperacillin-tazobactam (ZOSYN) 3,375 mg in sodium chloride 0.9 % 50 mL IVPB ([id number])  3,375 mg IntraVENous Q6H Vieira, Herber VASQUEZ MD   Stopped at 08/16/24 0737    sodium chloride flush 0.9 % injection 10 mL  10 mL IntraVENous 2 times per day Sheridan Leary APRN - CNP   10 mL at 08/16/24 0736    sodium chloride flush 0.9 % injection 10 mL  10 mL IntraVENous PRN Sheridan Leary APRN - CNP        0.9 % sodium chloride infusion   IntraVENous PRN Sheridan Leary APRN - CNP        potassium chloride (KLOR-CON M) extended release tablet 40 mEq  40 mEq Oral PRN Sheridan Leary APRN - CNP        Or    potassium bicarb-citric acid (EFFER-K) effervescent tablet 40 mEq  40 mEq Oral PRN Sheridan Leary APRN - CNP        Or    potassium chloride 10 mEq/100 mL IVPB (Peripheral Line)  10 mEq IntraVENous PRN Sheridan Leary APRN - CNP         ondansetron (ZOFRAN-ODT) disintegrating tablet 4 mg  4 mg Oral Q8H PRN Sheridan Leary APRN - CNP        Or    ondansetron (ZOFRAN) injection 4 mg  4 mg IntraVENous Q6H PRN Sheridan Leary APRN - CNP   4 mg at 08/14/24 1026    polyethylene glycol (GLYCOLAX) packet 17 g  17 g Oral Daily PRN Sheridan Leary APRN - CNP        [Held by provider] famotidine (PEPCID) tablet 20 mg  20 mg Oral BID Jocelyne Coley APRN - CNP        acetaminophen (TYLENOL) tablet 650 mg  650 mg Oral Q6H PRN Sheridan Leary APRN - CNP   650 mg at 08/15/24 1053    Or    acetaminophen (TYLENOL) suppository 650 mg  650 mg Rectal Q6H PRN hSeridan Leary APRN - CNP        morphine (PF) injection 2 mg  2 mg IntraVENous Q2H PRN Sheridan Leary APRN - CNP   2 mg at 08/16/24 0523    Or    morphine injection 4 mg  4 mg IntraVENous Q2H PRN Sheridan Leary APRN - CNP   4 mg at 08/14/24 2046    glucose chewable tablet 16 g  4 tablet Oral PRN Sheridan Leary APRN - CNP        dextrose bolus 10% 125 mL  125 mL IntraVENous PRN Sheridan Leary APRN - CNP        Or    dextrose bolus 10% 250 mL  250 mL IntraVENous PRN Sheridan Leary APRN - CNP        glucagon injection 1 mg  1 mg SubCUTAneous PRN Sheridan Leary APRN - CNP        dextrose 10 % infusion   IntraVENous Continuous PRN Sheridan Leary APRN - CNP        insulin lispro (HUMALOG,ADMELOG) injection vial 0-8 Units  0-8 Units SubCUTAneous TID WC Sheridan Leary APRN - CNP   4 Units at 08/16/24 0735    insulin lispro (HUMALOG,ADMELOG) injection vial 0-4 Units  0-4 Units SubCUTAneous Nightly Sheridan Leary APRN - CNP        enalaprilat (VASOTEC) injection 1.25 mg  1.25 mg IntraVENous Q6H PRN Sheridan Leary APRN - CNP        prochlorperazine (COMPAZINE) injection 10 mg  10 mg IntraVENous Once Sheridan Leary APRN - CNP         Allergies   Allergen

## 2024-08-16 NOTE — ANESTHESIA POSTPROCEDURE EVALUATION
Department of Anesthesiology  Postprocedure Note    Patient: Christina Sosa  MRN: [id number]  YOB: 1960  Date of evaluation: 8/16/2024    Procedure Summary       Date: 08/16/24 Room / Location: [address]    Anesthesia Start: 1016 Anesthesia Stop: 1225    Procedure: CHOLECYSTECTOMY LAPAROSCOPIC ROBOTIC Diagnosis:       Acute cholecystitis      (Acute cholecystitis [K81.0])    Surgeons: Herber Vieira MD Responsible Provider: Kaylen Mejia APRN - CRNA    Anesthesia Type: General ASA Status: 3            Anesthesia Type: General    Edwin Phase I: Edwin Score: 8    Edwin Phase II:      Anesthesia Post Evaluation    Patient location during evaluation: PACU  Patient participation: complete - patient participated  Level of consciousness: awake and alert  Airway patency: patent  Nausea & Vomiting: no nausea and no vomiting  Cardiovascular status: blood pressure returned to baseline  Respiratory status: acceptable  Hydration status: euvolemic  Pain management: adequate and satisfactory to patient    No notable events documented.

## 2024-08-16 NOTE — PROGRESS NOTES
[place]  Inpatient/Observation/Outpatient Rehabilitation    Date: 2024  Patient Name: Christina Sosa       [x] Inpatient Acute/Observation       []  Outpatient  : 1960       Plan of Care/Recert ends    [] Pt no showed for scheduled appointment    [] As a reminder, pt was contacted/attempted contact via phone of upcoming appointments.    [x] Pt refused/declined therapy at this time due to:      Pt in surgery during therapist's time, unable to see patient. Will see as soon as patient and therapist are available.      [] Pt cancelled due to:  [] No Reason Given   [] Sick/ill   [] Other:    [] Evaluation held by RN/Provider due to:    [] High Heart Rate   [] High Blood Pressure   [] Orthopedic Consult   [] Hgb < 7   [] Other:    [] Pt does not require skilled services due to:      Therapist/Assistant will attempt to see this patient, at our earliest opportunity.       BECKA Brewer Date: 2024

## 2024-08-16 NOTE — OP NOTE
Patient: Christina Sosa  YOB: 1960  MRN: [id number]    Date of Procedure: 8/16/2024    Pre-operative Diagnosis: Acute Cholecystitis with cholelithiasis    Post-operative Diagnosis:  Same + gangrenous cholecystitis    Procedure: Robotic Cholecystectomy    Surgeon(s):  Herber Vieira MD    Assistant:   First Assistant: Rose Delgado    Anesthesia: Monitor Anesthesia Care    Estimated Blood Loss (mL): 200     Complications: None    Specimens:   ID Type Source Tests Collected by Time Destination   1 : abdominal fluid for anaerobic and aerobic cultures Body Fluid Abdomen CULTURE, ANAEROBIC AND AEROBIC Herber Vieira MD 8/16/2024 1059    A : gallbladder Tissue Gallbladder SURGICAL PATHOLOGY Herber Vieira MD 8/16/2024 1051        Implants:  Implant Name Type Inv. Item Serial No.  Lot No. LRB No. Used Action   CLIP INT L POLYMER ANY LIG HEM O ANY (6EA/PK) - [id number]  CLIP INT L POLYMER ANY LIG HEM O ANY (6EA/PK)  [employer]- [id number] N/A 1 Implanted         Drains: * No LDAs found *    Patient was brought to the operating room and general anesthesia was induced.  Tap block was then placed by the CRNA for intraoperative and postoperative analgesia.  Her abdomen is then carefully prepped and draped.  Peritoneal access was gained through an infraumbilical incision.  Once into the peritoneum the abs insufflated with carbon dioxide.  Under direct vision robotic ports were then placed in the subxiphoid position and on the right side of her abdomen and roughly the midclavicular and anterior axillary lines at roughly the level of the umbilicus.  Patient had an inflammatory mass in the right upper quadrant and there were some purulent fluid along the right gutter consistent with intra-abdominal infection likely from acute cholecystitis.  The robot was brought onto the field targeted and docked.  Under direct vision operating instruments were inserted.  This point I went to the  table switched to the regular laparoscopic suction cannula which allowed little more vigorous irrigation and suctioning way of those loose material within the abdomen.  It appeared that her hemostasis is actually fairly good even though she did bleed a lot during the procedure.  At this point I scrubbed back into the operation.  The fascial incision at the umbilicus had to be extended to get this very edematous gallbladder out.  There was also a fairly large stone within it larger than a golf ball.  Pneumoperitoneum was reestablished.  Because of the difficulty I had getting the gallbladder out I decided could ahead and put a drain into the liver bed.  Drain was brought in through the umbilical port site and pulled out to the subxiphoid position.  He was placed in the subhepatic space.  Secured to the skin level with a 3-0 nylon suture.  Went ahead and evacuated the carbon dioxide at this point.  The ports were removed.  The fascia at umbilical port site was closed with 3 figure-of-eight 0 Vicryl sutures skin of all incisions except for the drain was was close subcuticular 4-0 Vicryl and Steri-Strips.  Patient tolerated the procedure well.    Patient clearly had a preoperative infection with elevated white blood cell count, and some purulent fluid from the necrotic cholecystitis in her abdomen.  Case classification is for septic.  Sponge needle and spit counts correct at the end the case.  Patient was awakened and transferred to recovery room in stable condition.    Electronically signed by ABA MARTINEZ MD on 8/16/2024 at 12:13 PM

## 2024-08-16 NOTE — PROGRESS NOTES
KENYA Russell made aware of pt's Na level of 130, KENYA Russell reviews chart and states ok to proceed.

## 2024-08-16 NOTE — ANESTHESIA PRE PROCEDURE
surgery.                 Cardiovascular:  Exercise tolerance: good (>4 METS)  (+) hypertension:, hyperlipidemia        Rhythm: regular  Rate: normal           Beta Blocker:  Not on Beta Blocker         Neuro/Psych:   Negative Neuro/Psych ROS              GI/Hepatic/Renal:   (+) GERD: well controlled          Endo/Other:    (+) DiabetesType II DM, poorly controlled.                 Abdominal:   (+) obese          Vascular: negative vascular ROS.         Other Findings:             Anesthesia Plan      general     ASA 3       Induction: intravenous.    MIPS: Postoperative opioids intended and Prophylactic antiemetics administered.  Anesthetic plan and risks discussed with patient.    Use of blood products discussed with patient whom consented to blood products.    Plan discussed with CRNA.                    VAUGHN Trevizo - KENYA   8/16/2024

## 2024-08-16 NOTE — PROGRESS NOTES
Called pharmacy to see if Zosyn should be given at this time as it was due at 1300 and patient recently returned from surgery where she received Ancef. Instructed to give Zosyn.    Discussed with patient. Symptoms started about Tuesday. No high fever. He does have some body aches. Discussed options. I think it is too late for Tamiflu since it is beyond 48 hours so both of us agreed to the Zithromax Z-DERREK.   Wife is currently on a

## 2024-08-16 NOTE — PROGRESS NOTES
Contacted Dr. Ayala as [facility] fluid order has . Inquired whether or not to reorder fluids or to saline lock. Instructed to saline lock IV.

## 2024-08-16 NOTE — PROGRESS NOTES
[facility]  Inpatient/Observation/Outpatient Rehabilitation    Date: 2024  Patient Name: Christina Sosa       [x] Inpatient Acute/Observation       []  Outpatient  : 1960       [] Pt no showed for scheduled appointment    [] As a reminder, pt was contacted/attempted contact via phone of upcoming appointments.    [x] Pt refused/declined therapy at this time due to:   AM PT eval attempted: Patient continues to request that therapy check back following her surgery today         [] Pt cancelled due to:  [] No Reason Given   [] Sick/ill   [] Other:    [] Evaluation held by RN/Provider due to:    [] High Heart Rate   [] High Blood Pressure   [] Orthopedic Consult   [] Hgb < 7   [] Other:    [] Pt does not require skilled services due to:      Therapist/Assistant will attempt to see this patient, at our earliest opportunity.       Rajni Murry, PT, DPT  Date: 2024

## 2024-08-16 NOTE — PROGRESS NOTES
Assessment and vitals obtained. Patient alert and oriented x4. Eager to go to surgery today due to her constant abdominal pain. Currently on room air. Call light within reach.

## 2024-08-16 NOTE — PROGRESS NOTES
[facility], report given to GEMMA De León. Pt's vitals stable, on 2L NC, resting comfortably, FLACC 2/10, rating pain 6/10. Informed oncoming nurse that temp is around 99.9 but according to op note there was infection present from gallbladder and cultures were sent from the OR.    Discharge Criteria    Inpatients must meet Criteria 1 through 7. All other patients are either YES or N/A. If a NO is chosen then Anesthesia or Surgeon must be notified.      1.  Minimum 30 minutes after last dose of sedative medication.    Yes      2.  Systolic BP between 90 - 160. Diastolic BP between 60 - 90.    Yes, but diastolic BP is low but ok [facility] KENYA Russell, MAP is WNL.      3.  Pulse between 60 - 120    Yes      4.  Respirations between 8 - 25.    Yes      5.  SpO2 92% - 100%.    Yes, on 3L NC.      6.  Able to cough and swallow or return to baseline function.    Yes      7.  Alert and oriented or return to baseline mental status.    Yes, pt remains drowsy but wakes easily, [facility] per KENYA Russell.

## 2024-08-16 NOTE — ANESTHESIA PROCEDURE NOTES
Peripheral Block    Patient location during procedure: OR  Reason for block: post-op pain management and at surgeon's request  Start time: 8/16/2024 10:20 AM  End time: 8/16/2024 10:31 AM  Staffing  Performed: resident/CRNA and other anesthesia staff   Resident/CRNA: Edis Serrano APRN - CRNA  Other anesthesia staff: Gabby Sears APRN - CRNA  Performed by: Edis Serrano APRN - CRNA  Authorized by: Edis Serrano APRN - CRNA    Preanesthetic Checklist  Completed: patient identified, IV checked, site marked, risks and benefits discussed, surgical/procedural consents, equipment checked, pre-op evaluation, timeout performed, anesthesia consent given, oxygen available, monitors applied/VS acknowledged, fire risk safety assessment completed and verbalized and blood product R/B/A discussed and consented  Peripheral Block   Patient position: supine  Prep: ChloraPrep  Provider prep: mask and sterile gloves  Patient monitoring: continuous pulse ox, IV access, cardiac monitor, continuous capnometry, frequent blood pressure checks and oxygen  Block type: TAP and Rectus sheath  Laterality: bilateral  Injection technique: single-shot  Guidance: ultrasound guided  Local infiltration: ropivacaine and decadron (See MAR for details.)  Local infiltration: ropivacaine and decadron (See MAR for details.)    Needle   Needle type: Other   Needle gauge: 22 G  Needle localization: ultrasound guidance  Needle length: 11 cmOther needle type: pajunk  Assessment   Injection assessment: no paresthesia on injection, negative aspiration for heme and no intravascular symptoms  Paresthesia pain: none  Slow fractionated injection: yes  Hemodynamics: stable  Outcomes: uncomplicated and patient tolerated procedure well

## 2024-08-17 LAB
ALBUMIN SERPL-MCNC: 3.1 G/DL (ref 3.5–5.2)
ALBUMIN/GLOB SERPL: 1 {RATIO} (ref 1–2.5)
ALP SERPL-CCNC: 159 U/L (ref 35–104)
ALT SERPL-CCNC: 17 U/L (ref 5–33)
ANION GAP SERPL CALCULATED.3IONS-SCNC: 14 MMOL/L (ref 9–17)
AST SERPL-CCNC: 21 U/L
BASOPHILS # BLD: <0.03 K/UL (ref 0–0.2)
BASOPHILS NFR BLD: 0 % (ref 0–2)
BILIRUB SERPL-MCNC: 0.4 MG/DL (ref 0.3–1.2)
BUN SERPL-MCNC: 14 MG/DL (ref 8–23)
BUN/CREAT SERPL: 23 (ref 9–20)
CALCIUM SERPL-MCNC: 7.8 MG/DL (ref 8.6–10.4)
CHLORIDE SERPL-SCNC: 101 MMOL/L (ref 98–107)
CO2 SERPL-SCNC: 21 MMOL/L (ref 20–31)
CREAT SERPL-MCNC: 0.6 MG/DL (ref 0.5–0.9)
EKG ATRIAL RATE: 63 BPM
EKG P AXIS: 66 DEGREES
EKG P-R INTERVAL: 158 MS
EKG Q-T INTERVAL: 436 MS
EKG QRS DURATION: 96 MS
EKG QTC CALCULATION (BAZETT): 446 MS
EKG R AXIS: -3 DEGREES
EKG T AXIS: 21 DEGREES
EKG VENTRICULAR RATE: 63 BPM
EOSINOPHIL # BLD: <0.03 K/UL (ref 0–0.44)
EOSINOPHILS RELATIVE PERCENT: 0 % (ref 1–4)
ERYTHROCYTE [DISTWIDTH] IN BLOOD BY AUTOMATED COUNT: 16.1 % (ref 11.8–14.4)
GFR, ESTIMATED: >90 ML/MIN/1.73M2
GLUCOSE BLD-MCNC: 282 MG/DL (ref 74–100)
GLUCOSE BLD-MCNC: 299 MG/DL (ref 74–100)
GLUCOSE BLD-MCNC: 313 MG/DL (ref 74–100)
GLUCOSE BLD-MCNC: 373 MG/DL (ref 74–100)
GLUCOSE SERPL-MCNC: 319 MG/DL (ref 70–99)
HCT VFR BLD AUTO: 32.9 % (ref 36.3–47.1)
HGB BLD-MCNC: 10.7 G/DL (ref 11.9–15.1)
IMM GRANULOCYTES # BLD AUTO: 0.12 K/UL (ref 0–0.3)
IMM GRANULOCYTES NFR BLD: 1 %
LYMPHOCYTES NFR BLD: 0.73 K/UL (ref 1.1–3.7)
LYMPHOCYTES RELATIVE PERCENT: 6 % (ref 24–43)
MCH RBC QN AUTO: 29.8 PG (ref 25.2–33.5)
MCHC RBC AUTO-ENTMCNC: 32.5 G/DL (ref 28.4–34.8)
MCV RBC AUTO: 91.6 FL (ref 82.6–102.9)
MONOCYTES NFR BLD: 0.43 K/UL (ref 0.1–1.2)
MONOCYTES NFR BLD: 4 % (ref 3–12)
NEUTROPHILS NFR BLD: 89 % (ref 36–65)
NEUTS SEG NFR BLD: 10.67 K/UL (ref 1.5–8.1)
NRBC BLD-RTO: 0 PER 100 WBC
PLATELET # BLD AUTO: 212 K/UL (ref 138–453)
PMV BLD AUTO: 12.7 FL (ref 8.1–13.5)
POTASSIUM SERPL-SCNC: 4.3 MMOL/L (ref 3.7–5.3)
PROT SERPL-MCNC: 6.2 G/DL (ref 6.4–8.3)
RBC # BLD AUTO: 3.59 M/UL (ref 3.95–5.11)
SODIUM SERPL-SCNC: 136 MMOL/L (ref 135–144)
WBC OTHER # BLD: 12 K/UL (ref 3.5–11.3)

## 2024-08-17 PROCEDURE — 80053 COMPREHEN METABOLIC PANEL: CPT

## 2024-08-17 PROCEDURE — 93005 ELECTROCARDIOGRAM TRACING: CPT | Performed by: INTERNAL MEDICINE

## 2024-08-17 PROCEDURE — 6370000000 HC RX 637 (ALT 250 FOR IP): Performed by: NURSE ANESTHETIST, CERTIFIED REGISTERED

## 2024-08-17 PROCEDURE — 6360000002 HC RX W HCPCS: Performed by: SURGERY

## 2024-08-17 PROCEDURE — 85025 COMPLETE CBC W/AUTO DIFF WBC: CPT

## 2024-08-17 PROCEDURE — 2580000003 HC RX 258: Performed by: SURGERY

## 2024-08-17 PROCEDURE — 82947 ASSAY GLUCOSE BLOOD QUANT: CPT

## 2024-08-17 PROCEDURE — 93010 ELECTROCARDIOGRAM REPORT: CPT | Performed by: FAMILY MEDICINE

## 2024-08-17 PROCEDURE — 1200000000 HC SEMI PRIVATE

## 2024-08-17 PROCEDURE — 6370000000 HC RX 637 (ALT 250 FOR IP): Performed by: SURGERY

## 2024-08-17 PROCEDURE — 36415 COLL VENOUS BLD VENIPUNCTURE: CPT

## 2024-08-17 PROCEDURE — 94761 N-INVAS EAR/PLS OXIMETRY MLT: CPT

## 2024-08-17 RX ADMIN — PIPERACILLIN AND TAZOBACTAM 3375 MG: 3; .375 INJECTION, POWDER, LYOPHILIZED, FOR SOLUTION INTRAVENOUS at 13:21

## 2024-08-17 RX ADMIN — MORPHINE SULFATE 4 MG: 4 INJECTION, SOLUTION INTRAMUSCULAR; INTRAVENOUS at 04:24

## 2024-08-17 RX ADMIN — SODIUM CHLORIDE 50 ML: 9 INJECTION, SOLUTION INTRAVENOUS at 18:55

## 2024-08-17 RX ADMIN — HYDROCODONE BITARTRATE AND ACETAMINOPHEN 1 TABLET: 5; 325 TABLET ORAL at 14:12

## 2024-08-17 RX ADMIN — INSULIN LISPRO 4 UNITS: 100 INJECTION, SOLUTION INTRAVENOUS; SUBCUTANEOUS at 17:49

## 2024-08-17 RX ADMIN — INSULIN LISPRO 8 UNITS: 100 INJECTION, SOLUTION INTRAVENOUS; SUBCUTANEOUS at 13:19

## 2024-08-17 RX ADMIN — INSULIN LISPRO 6 UNITS: 100 INJECTION, SOLUTION INTRAVENOUS; SUBCUTANEOUS at 10:35

## 2024-08-17 RX ADMIN — SODIUM CHLORIDE, PRESERVATIVE FREE 10 ML: 5 INJECTION INTRAVENOUS at 07:50

## 2024-08-17 RX ADMIN — HYDROCODONE BITARTRATE AND ACETAMINOPHEN 1 TABLET: 5; 325 TABLET ORAL at 19:55

## 2024-08-17 RX ADMIN — PIPERACILLIN AND TAZOBACTAM 3375 MG: 3; .375 INJECTION, POWDER, LYOPHILIZED, FOR SOLUTION INTRAVENOUS at 18:56

## 2024-08-17 RX ADMIN — ONDANSETRON 4 MG: 2 INJECTION INTRAMUSCULAR; INTRAVENOUS at 08:22

## 2024-08-17 RX ADMIN — MORPHINE SULFATE 2 MG: 2 INJECTION, SOLUTION INTRAMUSCULAR; INTRAVENOUS at 23:12

## 2024-08-17 RX ADMIN — HYDROCODONE BITARTRATE AND ACETAMINOPHEN 1 TABLET: 5; 325 TABLET ORAL at 07:49

## 2024-08-17 RX ADMIN — PIPERACILLIN AND TAZOBACTAM 3375 MG: 3; .375 INJECTION, POWDER, LYOPHILIZED, FOR SOLUTION INTRAVENOUS at 07:50

## 2024-08-17 RX ADMIN — PIPERACILLIN AND TAZOBACTAM 3375 MG: 3; .375 INJECTION, POWDER, LYOPHILIZED, FOR SOLUTION INTRAVENOUS at 00:16

## 2024-08-17 ASSESSMENT — PAIN DESCRIPTION - ONSET
ONSET: ON-GOING
ONSET: ON-GOING

## 2024-08-17 ASSESSMENT — PAIN SCALES - GENERAL
PAINLEVEL_OUTOF10: 5
PAINLEVEL_OUTOF10: 8
PAINLEVEL_OUTOF10: 8
PAINLEVEL_OUTOF10: 10
PAINLEVEL_OUTOF10: 4
PAINLEVEL_OUTOF10: 8
PAINLEVEL_OUTOF10: 5
PAINLEVEL_OUTOF10: 8

## 2024-08-17 ASSESSMENT — PAIN DESCRIPTION - LOCATION
LOCATION: ABDOMEN

## 2024-08-17 ASSESSMENT — PAIN DESCRIPTION - ORIENTATION
ORIENTATION: MID
ORIENTATION: MID;RIGHT
ORIENTATION: MID;RIGHT

## 2024-08-17 ASSESSMENT — PAIN - FUNCTIONAL ASSESSMENT
PAIN_FUNCTIONAL_ASSESSMENT: ACTIVITIES ARE NOT PREVENTED

## 2024-08-17 ASSESSMENT — PAIN DESCRIPTION - DESCRIPTORS
DESCRIPTORS: ACHING;SHARP
DESCRIPTORS: ACHING;DISCOMFORT
DESCRIPTORS: PRESSURE
DESCRIPTORS: ACHING
DESCRIPTORS: ACHING;DISCOMFORT

## 2024-08-17 ASSESSMENT — PAIN DESCRIPTION - PAIN TYPE
TYPE: SURGICAL PAIN
TYPE: SURGICAL PAIN

## 2024-08-17 ASSESSMENT — PAIN DESCRIPTION - FREQUENCY
FREQUENCY: CONTINUOUS
FREQUENCY: CONTINUOUS

## 2024-08-17 NOTE — PLAN OF CARE
Problem: Discharge Planning  Goal: Discharge to home or other facility with appropriate resources  Outcome: Progressing  Flowsheets  Taken 8/16/2024 2304  Discharge to home or other facility with appropriate resources:   Identify barriers to discharge with patient and caregiver   Arrange for needed discharge resources and transportation as appropriate   Identify discharge learning needs (meds, wound care, etc)   Refer to discharge planning if patient needs post-hospital services based on physician order or complex needs related to functional status, cognitive ability or social support system  Taken 8/16/2024 1915  Discharge to home or other facility with appropriate resources:   Identify barriers to discharge with patient and caregiver   Arrange for needed discharge resources and transportation as appropriate   Identify discharge learning needs (meds, wound care, etc)   Refer to discharge planning if patient needs post-hospital services based on physician order or complex needs related to functional status, cognitive ability or social support system     Problem: Pain  Goal: Verbalizes/displays adequate comfort level or baseline comfort level  Outcome: Progressing  Flowsheets (Taken 8/16/2024 0341 by Erna Brasher, RN)  Verbalizes/displays adequate comfort level or baseline comfort level:   Encourage patient to monitor pain and request assistance   Assess pain using appropriate pain scale   Administer analgesics based on type and severity of pain and evaluate response   Implement non-pharmacological measures as appropriate and evaluate response   Notify Licensed Independent Practitioner if interventions unsuccessful or patient reports new pain     Problem: Safety - Adult  Goal: Free from fall injury  Outcome: Progressing  Flowsheets (Taken 8/17/2024 0444)  Free From Fall Injury: Instruct family/caregiver on patient safety     Problem: Chronic Conditions and Co-morbidities  Goal: Patient's chronic conditions

## 2024-08-17 NOTE — PROGRESS NOTES
[facility]  Inpatient/Observation/Outpatient Rehabilitation    Date: 2024  Patient Name: Christina Sosa       [x] Inpatient Acute/Observation       []  Outpatient  : 1960         [] Pt no showed for scheduled appointment    [] As a reminder, pt was contacted/attempted contact via phone of upcoming appointments.    [x] Pt refused/declined therapy at this time due to:           [] Pt cancelled due to:  [] No Reason Given   [] Sick/ill   [] Other:    [] Evaluation held by RN/Provider due to:    [] High Heart Rate   [] High Blood Pressure   [] Orthopedic Consult   [] Hgb < 7   [] Other:    [] Pt does not require skilled services due to:      Pt not seen for OT session this date due to pt refusal. Pt reports not feeling well and states \"I don't need therapy\".     Therapist/Assistant will attempt to see this patient, at our earliest opportunity.       LAWRENCE Dean Date: 2024

## 2024-08-17 NOTE — PROGRESS NOTES
Progress Note    SUBJECTIVE:  FU related to feeling better.  Ambulated.  Tolerating liquids.    OBJECTIVE:    Vitals:   TEMPERATURE:  Current - Temp: 97.8 °F (36.6 °C); Max - Temp  Av.4 °F (36.9 °C)  Min: 97.2 °F (36.2 °C)  Max: 99.9 °F (37.7 °C)  RESPIRATIONS RANGE: Resp  Av.2  Min: 14  Max: 23  PULSE RANGE: Pulse  Av.9  Min: 61  Max: 92  BLOOD PRESSURE RANGE:  Systolic (24hrs), Av , Min:104 , Max:150   ; Diastolic (24hrs), Av, Min:39, Max:87    PULSE OXIMETRY RANGE: SpO2  Av.8 %  Min: 92 %  Max: 97 %  24HR INTAKE/OUTPUT:    Intake/Output Summary (Last 24 hours) at 2024 0853  Last data filed at 2024 0625  Gross per 24 hour   Intake 9.27 ml   Output 165 ml   Net 1874.27 ml     -----------------------------------------------------------------  Exam:  General: alert  HEENT: Supple neck & negative  Heart: Regular  Lungs: clear to auscultation bilaterally & no retractions  Abdomen: Normal & soft, dressing noted  Extremities:  No edema   Neuro: NonFocal     -----------------------------------------------------------------  Diagnostic Data:  Lab Results   Component Value Date    WBC 12.0 (H) 2024    HGB 10.7 (L) 2024     2024       Lab Results   Component Value Date    BUN 14 2024    CREATININE 0.6 2024     2024    K 4.3 2024    CALCIUM 7.8 (L) 2024     2024    CO2 21 2024    LABGLOM >90 2024       Lab Results   Component Value Date    WBCUA None 2024    RBCUA None 2024    LEUKOCYTESUR NEGATIVE 2024    GLUCOSEU NEGATIVE 2024    KETUA TRACE (A) 2024    PROTEINU TRACE (A) 2024    HGBUR NEGATIVE 2024    CASTUA NOT REPORTED 2016    BACTERIA NOT REPORTED 2016    YEAST NOT REPORTED 2016       No results found for: \"MYOGLOBIN\", \"TROPONINT\", \"CKTOTAL\", \"CKMB\", \"PROBNP\"    CT HEAD WO CONTRAST    Addendum Date: 2024    ADDENDUM: Negative  no focal pulmonary consolidation, pneumothorax, or pleural effusion. Soft Tissues/Bones: No aggressive lytic or blastic bony lesion.  Diffuse idiopathic skeletal hyperostosis. CT abdomen and pelvis: Organs: Cholelithiasis with gallbladder wall thickening and pericholecystic fluid.  Liver, spleen, pancreas, adrenal glands are unremarkable.  Kidneys enhance symmetrically.  No hydronephrosis.  Incidental 1.2 cm right renal cortical cyst for which no additional follow-up is warranted. Bowel: Moderate volume of stool in the colon.  No evidence of bowel obstruction or free intraperitoneal air.  Small hiatal hernia.  Terminal ileum is grossly unremarkable.  Appendix is not seen but there are no secondary changes of inflammation in the right lower quadrant. Pelvis: Enlarged lobulated partially calcified uterus secondary to fibroids. Bladder is unremarkable.  No free fluid in the pelvis.  There is no evidence of inguinal or pelvic lymphadenopathy. Retroperitoneum: Abdominal aortic caliber is within normal range.  Aorta is atherosclerotic.  No retroperitoneal adenopathy. Bones and soft tissues: No aggressive lytic or blastic bony lesion.     Chest: 1. No evidence of pulmonary embolus or acute pulmonary abnormality. 2. Coronary artery atherosclerosis. Abdomen and pelvis: 1. Cholelithiasis with gallbladder wall thickening and pericholecystic fluid. This is suspicious for acute cholecystitis. 2. Fibroid uterus.     XR CHEST PORTABLE    Result Date: 8/14/2024  EXAMINATION: ONE XRAY VIEW OF THE CHEST 8/13/2024 11:54 pm COMPARISON: None. HISTORY: ORDERING SYSTEM PROVIDED HISTORY: chest pain TECHNOLOGIST PROVIDED HISTORY: chest pain FINDINGS: Lungs: Low lung volumes with bronchovascular crowding.  No focal consolidation. Pleura: No effusion or pneumothorax. Cardiomediastinal silhouette: Tortuosity of the thoracic aorta. Normal heart size. Bones: No acute bony findings. Soft tissues: Normal.     Low lung volumes with bronchovascular

## 2024-08-17 NOTE — PROGRESS NOTES
Vitals and assessment were completed at this time. Writer walked patient through the medications that would be administered tonight and encouraged patient to ask questions about the medications and therapies. Patient denies questions.   Patient's lap sites are clean dry and intact, the mid lap site has old serosanguinous drainage. There is serosanguinous drainage on the drain site as well. Will continued to monitor and assess.  Call light and bedside table remain within reach. Patient denies needs at this time. Care ongoing.

## 2024-08-17 NOTE — PROGRESS NOTES
[facility]  Inpatient/Observation/Outpatient Rehabilitation    Date: 2024  Patient Name: Christina Sosa       [x] Inpatient Acute/Observation   : 1960       [x] Pt refused/declined therapy at this time due to:        Pt was up to the bathroom independently as PT entered room. She reported she does not need PT and has no concerns with returning home. Consulted RN who would like her to be checked tomorrow. 24       Therapist/Assistant will attempt to see this patient, at our earliest opportunity.       Darinel Castro, PT, DPT, OCS, Cert. DN  Date: 2024

## 2024-08-17 NOTE — PROGRESS NOTES
Writer changed patient's dressing on her mid abdomen. Writer informed supervisor of need to change the dressing again. ABD was folded and placed over the dressing. Patient tolerated well. Call light and bedside table remain within reach. Care ongoing.

## 2024-08-17 NOTE — PROGRESS NOTES
Writer reassessed patient and found the drain-sponge saturated through to patient's gown. Writer removed the dressing and redressed with with 2 drain sponges and paper tape. Writer also reinforced patient's mid abdominal incision. Surgical scrub was used around both sites. Patient was given a new gown and repositioned for bed for comfort. Drain was emptied of 60ml. Will reassess in one hour.

## 2024-08-17 NOTE — PROGRESS NOTES
Writer to bedside to complete morning assessment. Upon entry to room, pt in bed awake, respirations even and unlabored while on room air. Vitals obtained and assessment completed, see flow sheet for details. Pt is A&Ox4.Pt complaining of surgical pain, prn pain medication to be given shortly. Lung sounds are clear, diminished throughout. Abdominal dressings are in tact with old drainage noted. Writer applied new dressings x2. Abdominal quadrants x3 are hypoactive and LLQ is active, pt denies passing flatus. DENISSE drain is in place, with old drainage, new dressing applied and bulb is compressed. Pt updated on plan of care and whiteboard updated. Pt denies further needs from writer at this time. Call light in reach. Care ongoing.

## 2024-08-17 NOTE — PROGRESS NOTES
Patient accidentally removed her left hand IV. Patient was washed up and given a completed bed change and gown change. Pain medication was also given for 10/10 pain after all of the moving patient did.   Patient's mid lap site has a small amount of drainage and the yancy drain site has a small amount of drainage but does not require a dressing change at this time.   Call light and bedside table remain within reach. Care ongoing.

## 2024-08-17 NOTE — PROGRESS NOTES
Writer at bedside due to pt complaining of nausea, prn Zofran medication given at this time. Pt states it happened after she tried to eat breakfast after diet was advanced to full liquid. Pt resting in bed with call light in reach, pt denies further needs at this time. Care ongoing.

## 2024-08-18 VITALS
SYSTOLIC BLOOD PRESSURE: 103 MMHG | HEART RATE: 59 BPM | RESPIRATION RATE: 14 BRPM | HEIGHT: 62 IN | OXYGEN SATURATION: 97 % | BODY MASS INDEX: 34.89 KG/M2 | TEMPERATURE: 97 F | WEIGHT: 189.6 LBS | DIASTOLIC BLOOD PRESSURE: 44 MMHG

## 2024-08-18 LAB
ALBUMIN SERPL-MCNC: 3.2 G/DL (ref 3.5–5.2)
ALBUMIN/GLOB SERPL: 1.1 {RATIO} (ref 1–2.5)
ALP SERPL-CCNC: 138 U/L (ref 35–104)
ALT SERPL-CCNC: 15 U/L (ref 5–33)
ANION GAP SERPL CALCULATED.3IONS-SCNC: 9 MMOL/L (ref 9–17)
AST SERPL-CCNC: 14 U/L
BASOPHILS # BLD: <0.03 K/UL (ref 0–0.2)
BASOPHILS NFR BLD: 0 % (ref 0–2)
BILIRUB SERPL-MCNC: 0.3 MG/DL (ref 0.3–1.2)
BUN SERPL-MCNC: 14 MG/DL (ref 8–23)
BUN/CREAT SERPL: 28 (ref 9–20)
CALCIUM SERPL-MCNC: 7.9 MG/DL (ref 8.6–10.4)
CHLORIDE SERPL-SCNC: 101 MMOL/L (ref 98–107)
CO2 SERPL-SCNC: 26 MMOL/L (ref 20–31)
CREAT SERPL-MCNC: 0.5 MG/DL (ref 0.5–0.9)
EOSINOPHIL # BLD: <0.03 K/UL (ref 0–0.44)
EOSINOPHILS RELATIVE PERCENT: 0 % (ref 1–4)
ERYTHROCYTE [DISTWIDTH] IN BLOOD BY AUTOMATED COUNT: 16.3 % (ref 11.8–14.4)
GFR, ESTIMATED: >90 ML/MIN/1.73M2
GLUCOSE BLD-MCNC: 246 MG/DL (ref 74–100)
GLUCOSE BLD-MCNC: 283 MG/DL (ref 74–100)
GLUCOSE BLD-MCNC: 328 MG/DL (ref 74–100)
GLUCOSE SERPL-MCNC: 304 MG/DL (ref 70–99)
HCT VFR BLD AUTO: 32.3 % (ref 36.3–47.1)
HGB BLD-MCNC: 10.7 G/DL (ref 11.9–15.1)
IMM GRANULOCYTES # BLD AUTO: 0.06 K/UL (ref 0–0.3)
IMM GRANULOCYTES NFR BLD: 1 %
LYMPHOCYTES NFR BLD: 1.91 K/UL (ref 1.1–3.7)
LYMPHOCYTES RELATIVE PERCENT: 26 % (ref 24–43)
MCH RBC QN AUTO: 30.2 PG (ref 25.2–33.5)
MCHC RBC AUTO-ENTMCNC: 33.1 G/DL (ref 28.4–34.8)
MCV RBC AUTO: 91.2 FL (ref 82.6–102.9)
MICROORGANISM SPEC CULT: ABNORMAL
MICROORGANISM SPEC CULT: NORMAL
MICROORGANISM SPEC CULT: NORMAL
MICROORGANISM/AGENT SPEC: ABNORMAL
MICROORGANISM/AGENT SPEC: ABNORMAL
MONOCYTES NFR BLD: 0.35 K/UL (ref 0.1–1.2)
MONOCYTES NFR BLD: 5 % (ref 3–12)
NEUTROPHILS NFR BLD: 69 % (ref 36–65)
NEUTS SEG NFR BLD: 5.11 K/UL (ref 1.5–8.1)
NRBC BLD-RTO: 0 PER 100 WBC
PLATELET # BLD AUTO: 252 K/UL (ref 138–453)
PMV BLD AUTO: 12.4 FL (ref 8.1–13.5)
POTASSIUM SERPL-SCNC: 4 MMOL/L (ref 3.7–5.3)
PROT SERPL-MCNC: 6.2 G/DL (ref 6.4–8.3)
RBC # BLD AUTO: 3.54 M/UL (ref 3.95–5.11)
SERVICE CMNT-IMP: ABNORMAL
SERVICE CMNT-IMP: NORMAL
SERVICE CMNT-IMP: NORMAL
SODIUM SERPL-SCNC: 136 MMOL/L (ref 135–144)
SPECIMEN DESCRIPTION: ABNORMAL
SPECIMEN DESCRIPTION: NORMAL
SPECIMEN DESCRIPTION: NORMAL
WBC OTHER # BLD: 7.5 K/UL (ref 3.5–11.3)

## 2024-08-18 PROCEDURE — 2580000003 HC RX 258: Performed by: SURGERY

## 2024-08-18 PROCEDURE — 6370000000 HC RX 637 (ALT 250 FOR IP): Performed by: INTERNAL MEDICINE

## 2024-08-18 PROCEDURE — 6360000002 HC RX W HCPCS: Performed by: SURGERY

## 2024-08-18 PROCEDURE — 6370000000 HC RX 637 (ALT 250 FOR IP): Performed by: SURGERY

## 2024-08-18 PROCEDURE — 6370000000 HC RX 637 (ALT 250 FOR IP): Performed by: NURSE ANESTHETIST, CERTIFIED REGISTERED

## 2024-08-18 PROCEDURE — 85025 COMPLETE CBC W/AUTO DIFF WBC: CPT

## 2024-08-18 PROCEDURE — 80053 COMPREHEN METABOLIC PANEL: CPT

## 2024-08-18 PROCEDURE — 36415 COLL VENOUS BLD VENIPUNCTURE: CPT

## 2024-08-18 PROCEDURE — 94761 N-INVAS EAR/PLS OXIMETRY MLT: CPT

## 2024-08-18 PROCEDURE — 82947 ASSAY GLUCOSE BLOOD QUANT: CPT

## 2024-08-18 RX ORDER — INSULIN LISPRO 100 [IU]/ML
0-4 INJECTION, SOLUTION INTRAVENOUS; SUBCUTANEOUS NIGHTLY
Status: DISCONTINUED | OUTPATIENT
Start: 2024-08-18 | End: 2024-08-18 | Stop reason: HOSPADM

## 2024-08-18 RX ORDER — INSULIN LISPRO 100 [IU]/ML
0-16 INJECTION, SOLUTION INTRAVENOUS; SUBCUTANEOUS
Status: DISCONTINUED | OUTPATIENT
Start: 2024-08-18 | End: 2024-08-18 | Stop reason: HOSPADM

## 2024-08-18 RX ORDER — HYDROCODONE BITARTRATE AND ACETAMINOPHEN 5; 325 MG/1; MG/1
1 TABLET ORAL EVERY 6 HOURS PRN
Status: DISCONTINUED | OUTPATIENT
Start: 2024-08-18 | End: 2024-08-18

## 2024-08-18 RX ORDER — INSULIN LISPRO 100 [IU]/ML
0-4 INJECTION, SOLUTION INTRAVENOUS; SUBCUTANEOUS NIGHTLY
Status: DISCONTINUED | OUTPATIENT
Start: 2024-08-18 | End: 2024-08-18

## 2024-08-18 RX ORDER — HYDROCODONE BITARTRATE AND ACETAMINOPHEN 5; 325 MG/1; MG/1
1 TABLET ORAL EVERY 6 HOURS PRN
Qty: 12 TABLET | Refills: 0 | Status: SHIPPED | OUTPATIENT
Start: 2024-08-18 | End: 2024-08-21

## 2024-08-18 RX ADMIN — SODIUM CHLORIDE, PRESERVATIVE FREE 10 ML: 5 INJECTION INTRAVENOUS at 09:31

## 2024-08-18 RX ADMIN — INSULIN LISPRO 8 UNITS: 100 INJECTION, SOLUTION INTRAVENOUS; SUBCUTANEOUS at 09:29

## 2024-08-18 RX ADMIN — PIPERACILLIN AND TAZOBACTAM 3375 MG: 3; .375 INJECTION, POWDER, LYOPHILIZED, FOR SOLUTION INTRAVENOUS at 07:08

## 2024-08-18 RX ADMIN — HYDROCODONE BITARTRATE AND ACETAMINOPHEN 1 TABLET: 5; 325 TABLET ORAL at 01:35

## 2024-08-18 RX ADMIN — PIPERACILLIN AND TAZOBACTAM 3375 MG: 3; .375 INJECTION, POWDER, LYOPHILIZED, FOR SOLUTION INTRAVENOUS at 01:21

## 2024-08-18 RX ADMIN — PIPERACILLIN AND TAZOBACTAM 3375 MG: 3; .375 INJECTION, POWDER, LYOPHILIZED, FOR SOLUTION INTRAVENOUS at 13:14

## 2024-08-18 RX ADMIN — HYDROCODONE BITARTRATE AND ACETAMINOPHEN 1 TABLET: 5; 325 TABLET ORAL at 09:34

## 2024-08-18 RX ADMIN — INSULIN LISPRO 12 UNITS: 100 INJECTION, SOLUTION INTRAVENOUS; SUBCUTANEOUS at 12:10

## 2024-08-18 RX ADMIN — POLYETHYLENE GLYCOL 3350 17 G: 17 POWDER, FOR SOLUTION ORAL at 13:17

## 2024-08-18 ASSESSMENT — PAIN - FUNCTIONAL ASSESSMENT
PAIN_FUNCTIONAL_ASSESSMENT: ACTIVITIES ARE NOT PREVENTED

## 2024-08-18 ASSESSMENT — PAIN DESCRIPTION - DESCRIPTORS
DESCRIPTORS: DISCOMFORT
DESCRIPTORS: DISCOMFORT
DESCRIPTORS: ACHING
DESCRIPTORS: DISCOMFORT;ACHING

## 2024-08-18 ASSESSMENT — PAIN SCALES - GENERAL
PAINLEVEL_OUTOF10: 7
PAINLEVEL_OUTOF10: 4
PAINLEVEL_OUTOF10: 4
PAINLEVEL_OUTOF10: 7
PAINLEVEL_OUTOF10: 4
PAINLEVEL_OUTOF10: 10

## 2024-08-18 ASSESSMENT — PAIN DESCRIPTION - ONSET
ONSET: PROGRESSIVE
ONSET: PROGRESSIVE
ONSET: ON-GOING

## 2024-08-18 ASSESSMENT — PAIN DESCRIPTION - LOCATION
LOCATION: ABDOMEN

## 2024-08-18 ASSESSMENT — PAIN DESCRIPTION - PAIN TYPE
TYPE: SURGICAL PAIN

## 2024-08-18 ASSESSMENT — PAIN DESCRIPTION - FREQUENCY
FREQUENCY: CONTINUOUS
FREQUENCY: INTERMITTENT
FREQUENCY: INTERMITTENT

## 2024-08-18 ASSESSMENT — PAIN DESCRIPTION - ORIENTATION
ORIENTATION: MID

## 2024-08-18 NOTE — DISCHARGE INSTR - DIET

## 2024-08-18 NOTE — PROGRESS NOTES
Writer went over discharge instructions with pt and spouse at beside. Writer educated pt on daily incision dressing changes and return demonstration preformed with pt and spouse. Writer educated pt on how to care and empty DENISSE drain, and use CHG soap solution at home for infection prevention, pt verbalized understanding. Writer educated pt on increasing signs and symptoms of infection and when to call Dr's office for questions or concerns. Pt provided with CHG soap, and incision dressings changes. Pt educated on new pain medications and possible side effects. Pt's IV removed and pt getting dressed. Pt denies further questions at this time.

## 2024-08-18 NOTE — PROGRESS NOTES
Dr. Vieira called for update on pt status at this time. Per Dr. Vieira is okay with pt being discharged with drain to stay in place until follow up and follow up with him in one week.

## 2024-08-18 NOTE — DISCHARGE INSTRUCTIONS
DENISSE drain will remain in place until follow up with with Dr. Vieira. If drain has small amount of output <30ml per day, pt can call on discharge day 3-4 to Dr. Vieira's office and see if she can have drain out soon, per Dr. Vieira. Dressing changes to incisions daily with saturation. Pt's instructed to use CHG solution around incision sites to prevent infection. Pt can take over the counter stool softener for constipation. Call Dr. Vieira's office for any questions or concerns.

## 2024-08-18 NOTE — PROGRESS NOTES
[facility]  Inpatient/Observation/Outpatient Rehabilitation    Date: 2024  Patient Name: Christina Sosa       [x] Inpatient Acute/Observation       []  Outpatient  : 1960         [] Pt no showed for scheduled appointment    [] As a reminder, pt was contacted/attempted contact via phone of upcoming appointments.    [x] Pt refused/declined therapy at this time due to:           [] Pt cancelled due to:  [] No Reason Given   [] Sick/ill   [] Other:    [] Evaluation held by RN/Provider due to:    [] High Heart Rate   [] High Blood Pressure   [] Orthopedic Consult   [] Hgb < 7   [] Other:    [] Pt does not require skilled services due to:      Pt not seen for OT session this date d/t pt refusal stating \"I don't need therapy, I can walk I'm fine\". Pt educated on importance of therapy session and pt continued to decline.     Therapist/Assistant will attempt to see this patient, at our earliest opportunity.       LAWRENCE Dean Date: 2024

## 2024-08-18 NOTE — PLAN OF CARE
Problem: Discharge Planning  Goal: Discharge to home or other facility with appropriate resources  Outcome: Completed     Problem: Pain  Goal: Verbalizes/displays adequate comfort level or baseline comfort level  Outcome: Completed     Problem: Safety - Adult  Goal: Free from fall injury  Outcome: Completed     Problem: Chronic Conditions and Co-morbidities  Goal: Patient's chronic conditions and co-morbidity symptoms are monitored and maintained or improved  Outcome: Completed     Problem: Skin/Tissue Integrity  Goal: Absence of new skin breakdown  Description: 1.  Monitor for areas of redness and/or skin breakdown  2.  Assess vascular access sites hourly  3.  Every 4-6 hours minimum:  Change oxygen saturation probe site  4.  Every 4-6 hours:  If on nasal continuous positive airway pressure, respiratory therapy assess nares and determine need for appliance change or resting period.  Outcome: Completed     Problem: Nutrition Deficit:  Goal: Optimize nutritional status  Outcome: Completed

## 2024-08-18 NOTE — PROGRESS NOTES
Progress Note    SUBJECTIVE:  FU related to feeling better.  Ambulated.  Did okay with liquids yesterday.  Had 1 episode of vomiting.  A little more cantankerous today    OBJECTIVE:    Vitals:   TEMPERATURE:  Current - Temp: 97.4 °F (36.3 °C); Max - Temp  Av.3 °F (36.3 °C)  Min: 97.1 °F (36.2 °C)  Max: 97.4 °F (36.3 °C)  RESPIRATIONS RANGE: Resp  Av.3  Min: 16  Max: 17  PULSE RANGE: Pulse  Av.8  Min: 60  Max: 130  BLOOD PRESSURE RANGE:  Systolic (24hrs), Av , Min:120 , Max:127   ; Diastolic (24hrs), Av, Min:53, Max:79    PULSE OXIMETRY RANGE: SpO2  Av.3 %  Min: 97 %  Max: 98 %  24HR INTAKE/OUTPUT:    Intake/Output Summary (Last 24 hours) at 2024 0710  Last data filed at 2024 0400  Gross per 24 hour   Intake 900 ml   Output 60 ml   Net 840 ml     -----------------------------------------------------------------  Exam:  General: alert  HEENT: Supple neck & negative  Heart: Regular  Lungs: clear to auscultation bilaterally & no retractions  Abdomen: Soft.  Negative.  Extremities:  No edema   Neuro: NonFocal     -----------------------------------------------------------------  Diagnostic Data:  Lab Results   Component Value Date    WBC 7.5 2024    HGB 10.7 (L) 2024     2024       Lab Results   Component Value Date    BUN 14 2024    CREATININE 0.5 2024     2024    K 4.0 2024    CALCIUM 7.9 (L) 2024     2024    CO2 26 2024    LABGLOM >90 2024       Lab Results   Component Value Date    WBCUA None 2024    RBCUA None 2024    LEUKOCYTESUR NEGATIVE 2024    GLUCOSEU NEGATIVE 2024    KETUA TRACE (A) 2024    PROTEINU TRACE (A) 2024    HGBUR NEGATIVE 2024    CASTUA NOT REPORTED 2016    BACTERIA NOT REPORTED 2016    YEAST NOT REPORTED 2016       No results found for: \"MYOGLOBIN\", \"TROPONINT\", \"CKTOTAL\", \"CKMB\", \"PROBNP\"    CT HEAD WO  no bronchiectasis or bronchial wall thickening. There is no focal pulmonary consolidation, pneumothorax, or pleural effusion. Soft Tissues/Bones: No aggressive lytic or blastic bony lesion.  Diffuse idiopathic skeletal hyperostosis. CT abdomen and pelvis: Organs: Cholelithiasis with gallbladder wall thickening and pericholecystic fluid.  Liver, spleen, pancreas, adrenal glands are unremarkable.  Kidneys enhance symmetrically.  No hydronephrosis.  Incidental 1.2 cm right renal cortical cyst for which no additional follow-up is warranted. Bowel: Moderate volume of stool in the colon.  No evidence of bowel obstruction or free intraperitoneal air.  Small hiatal hernia.  Terminal ileum is grossly unremarkable.  Appendix is not seen but there are no secondary changes of inflammation in the right lower quadrant. Pelvis: Enlarged lobulated partially calcified uterus secondary to fibroids. Bladder is unremarkable.  No free fluid in the pelvis.  There is no evidence of inguinal or pelvic lymphadenopathy. Retroperitoneum: Abdominal aortic caliber is within normal range.  Aorta is atherosclerotic.  No retroperitoneal adenopathy. Bones and soft tissues: No aggressive lytic or blastic bony lesion.     Chest: 1. No evidence of pulmonary embolus or acute pulmonary abnormality. 2. Coronary artery atherosclerosis. Abdomen and pelvis: 1. Cholelithiasis with gallbladder wall thickening and pericholecystic fluid. This is suspicious for acute cholecystitis. 2. Fibroid uterus.     XR CHEST PORTABLE    Result Date: 8/14/2024  EXAMINATION: ONE XRAY VIEW OF THE CHEST 8/13/2024 11:54 pm COMPARISON: None. HISTORY: ORDERING SYSTEM PROVIDED HISTORY: chest pain TECHNOLOGIST PROVIDED HISTORY: chest pain FINDINGS: Lungs: Low lung volumes with bronchovascular crowding.  No focal consolidation. Pleura: No effusion or pneumothorax. Cardiomediastinal silhouette: Tortuosity of the thoracic aorta. Normal heart size. Bones: No acute bony findings. Soft

## 2024-08-18 NOTE — PROGRESS NOTES
[facility]  Inpatient/Observation/Outpatient Rehabilitation    Date: 2024  Patient Name: Christina Sosa       [x] Inpatient Acute/Observation    : 1960     [x] Pt refused/declined therapy at this time due to:        Pt reported she's been walking the halls on her own and does not need PT. Confirmed with RN.  Will discharge the PT order at this time.        Darinel Castro, PT, DPT, OCS, Cert. DN  Date: 2024

## 2024-08-18 NOTE — PLAN OF CARE
Problem: Discharge Planning  Goal: Discharge to home or other facility with appropriate resources  Outcome: Progressing  Flowsheets (Taken 8/17/2024 2000)  Discharge to home or other facility with appropriate resources:   Identify barriers to discharge with patient and caregiver   Arrange for needed discharge resources and transportation as appropriate   Arrange for interpreters to assist at discharge as needed   Identify discharge learning needs (meds, wound care, etc)     Problem: Pain  Goal: Verbalizes/displays adequate comfort level or baseline comfort level  Outcome: Progressing     Problem: Safety - Adult  Goal: Free from fall injury  Outcome: Progressing  Flowsheets (Taken 8/17/2024 2000)  Free From Fall Injury:   Based on caregiver fall risk screen, instruct family/caregiver to ask for assistance with transferring infant if caregiver noted to have fall risk factors   Instruct family/caregiver on patient safety     Problem: Chronic Conditions and Co-morbidities  Goal: Patient's chronic conditions and co-morbidity symptoms are monitored and maintained or improved  Outcome: Progressing  Flowsheets (Taken 8/17/2024 2000)  Care Plan - Patient's Chronic Conditions and Co-Morbidity Symptoms are Monitored and Maintained or Improved:   Monitor and assess patient's chronic conditions and comorbid symptoms for stability, deterioration, or improvement   Collaborate with multidisciplinary team to address chronic and comorbid conditions and prevent exacerbation or deterioration   Update acute care plan with appropriate goals if chronic or comorbid symptoms are exacerbated and prevent overall improvement and discharge     Problem: Skin/Tissue Integrity  Goal: Absence of new skin breakdown  Description: 1.  Monitor for areas of redness and/or skin breakdown  2.  Assess vascular access sites hourly  3.  Every 4-6 hours minimum:  Change oxygen saturation probe site  4.  Every 4-6 hours:  If on nasal continuous positive

## 2024-08-18 NOTE — PROGRESS NOTES
Writer to bedside to complete morning assessment. Upon entry to room, pt in bed resting, respirations even and unlabored while on room air. Vitals obtained and assessment completed, see flow sheet for details. Pt is A&Ox4. Pt complaining of mild pain, but declines any prn pain medications at this time. Lung sounds are clear, diminished throughout. Abdominal incisions are intact and DENISSE drain dressing changed due to saturation and emptied. Pt states she's passing gas and burping but no bowel movement yet after surgery. Pt updated on plan of care and whiteboard updated. Pt denies further needs from writer at this time. Call light in reach. Care ongoing.

## 2024-08-19 DIAGNOSIS — G89.18 POST-OP PAIN: Primary | ICD-10-CM

## 2024-08-19 LAB
MICROORGANISM SPEC CULT: NORMAL
MICROORGANISM SPEC CULT: NORMAL
SERVICE CMNT-IMP: NORMAL
SERVICE CMNT-IMP: NORMAL
SPECIMEN DESCRIPTION: NORMAL
SPECIMEN DESCRIPTION: NORMAL

## 2024-08-19 RX ORDER — HYDROCODONE BITARTRATE AND ACETAMINOPHEN 5; 325 MG/1; MG/1
1 TABLET ORAL EVERY 6 HOURS PRN
Qty: 18 TABLET | Refills: 0 | Status: SHIPPED | OUTPATIENT
Start: 2024-08-19 | End: 2024-08-26

## 2024-08-19 NOTE — DISCHARGE SUMMARY
Discharge Summary    Christina Sosa  :  1960  MRN:  [id number]    Admit date:  2024      Discharge date:  2024     Admitting Physician:  Kb Ayala MD    Discharge Diagnoses:      Principal Problem:    Acute cholecystitis  Active Problems:    Type 2 diabetes mellitus without complication, with long-term current use of insulin (HCC)    Essential hypertension    Hypoxia  Resolved Problems:    * No resolved hospital problems. *      Active Hospital Problems    Diagnosis Date Noted    Acute cholecystitis [K81.0] 2024    Hypoxia [R09.02] 2024    Essential hypertension [I10] 2016    Type 2 diabetes mellitus without complication, with long-term current use of insulin (HCC) [E11.9, Z79.4]        Discharge Medications:         Medication List        START taking these medications      HYDROcodone-acetaminophen 5-325 MG per tablet  Commonly known as: NORCO  Take 1 tablet by mouth every 6 hours as needed for Pain for up to 3 days. Max Daily Amount: 4 tablets            CHANGE how you take these medications      ferrous sulfate 325 (65 Fe) MG tablet  Commonly known as: IRON 325  Take 1 tablet by mouth daily  What changed: when to take this            CONTINUE taking these medications      aspirin EC 81 MG EC tablet     * BD Pen Needle Elise U/F 32G X 4 MM Misc  Generic drug: Insulin Pen Needle  USE AS DIRECTED.     * UltiCare Pen Needles 29G X 12.7MM Misc  Generic drug: Insulin Pen Needle  1 box     blood glucose test strips strip  Commonly known as: ONE TOUCH ULTRA TEST  TEST FOUR TIMES A DAY.     HumaLOG 100 UNIT/ML Soln injection vial  Generic drug: insulin lispro     latanoprost 0.005 % ophthalmic solution  Commonly known as: XALATAN     lisinopril 20 MG tablet  Commonly known as: PRINIVIL;ZESTRIL  Take 1 tablet by mouth daily     omeprazole 20 MG EC tablet     ONE TOUCH ULTRA MINI w/Device Kit     pravastatin 40 MG tablet  Commonly known as: PRAVACHOL  Take 1 tablet by mouth

## 2024-08-20 ENCOUNTER — TELEPHONE (OUTPATIENT)
Dept: SURGERY | Age: 64
End: 2024-08-20

## 2024-08-20 LAB
MICROORGANISM SPEC CULT: ABNORMAL
MICROORGANISM SPEC CULT: ABNORMAL
MICROORGANISM/AGENT SPEC: ABNORMAL
MICROORGANISM/AGENT SPEC: ABNORMAL
SERVICE CMNT-IMP: ABNORMAL
SPECIMEN DESCRIPTION: ABNORMAL
SURGICAL PATHOLOGY REPORT: NORMAL

## 2024-08-26 ENCOUNTER — OFFICE VISIT (OUTPATIENT)
Dept: SURGERY | Age: 64
End: 2024-08-26

## 2024-08-26 VITALS
SYSTOLIC BLOOD PRESSURE: 137 MMHG | BODY MASS INDEX: 34.76 KG/M2 | DIASTOLIC BLOOD PRESSURE: 68 MMHG | HEART RATE: 67 BPM | WEIGHT: 187 LBS

## 2024-08-26 DIAGNOSIS — K81.0 ACUTE CHOLECYSTITIS: Primary | ICD-10-CM

## 2024-08-26 PROCEDURE — 99024 POSTOP FOLLOW-UP VISIT: CPT | Performed by: SURGERY

## 2024-08-26 NOTE — ED PROVIDER NOTES
History    Marital status:      Spouse name: None    Number of children: None    Years of education: None    Highest education level: None   Tobacco Use    Smoking status: Every Day     Current packs/day: 0.50     Average packs/day: 0.5 packs/day for 30.0 years (15.0 ttl pk-yrs)     Types: Cigarettes    Smokeless tobacco: Never    Tobacco comments:     one pack per day   Substance and Sexual Activity    Alcohol use: No     Alcohol/week: 0.0 standard drinks of alcohol    Drug use: No   Social History Narrative    ** Merged History Encounter **          Social Determinants of Health     Financial Resource Strain: Low Risk  (10/11/2023)    Overall Financial Resource Strain (CARDIA)     Difficulty of Paying Living Expenses: Not hard at all   Food Insecurity: No Food Insecurity (8/14/2024)    Hunger Vital Sign     Worried About Running Out of Food in the Last Year: Never true     Ran Out of Food in the Last Year: Never true   Transportation Needs: No Transportation Needs (8/14/2024)    PRAPARE - Transportation     Lack of Transportation (Medical): No     Lack of Transportation (Non-Medical): No   Physical Activity: Insufficiently Active (10/11/2023)    Exercise Vital Sign     Days of Exercise per Week: 3 days     Minutes of Exercise per Session: 10 min   Stress: No Stress Concern Present (10/11/2023)    [employer] [place] [employer] - Occupational Stress Questionnaire     Feeling of Stress : Only a little   Social Connections: Moderately Isolated (10/11/2023)    Social Connection and Isolation Panel [NHANES]     Frequency of Communication with Friends and Family: More than three times a week     Frequency of Social Gatherings with Friends and Family: Never     Attends Jehovah's witness Services: Never     Active Member of Clubs or Organizations: No     Attends Club or Organization Meetings: Never     Marital Status:    Intimate Partner Violence: Not At Risk (10/11/2023)    Humiliation, Afraid, Rape, and    POCT GLUCOSE   POCT GLUCOSE   POCT GLUCOSE   POCT GLUCOSE   POCT GLUCOSE   POCT GLUCOSE   POCT GLUCOSE   POCT GLUCOSE   POCT GLUCOSE   POCT GLUCOSE   POCT GLUCOSE   POCT GLUCOSE   POCT GLUCOSE   POCT GLUCOSE   POCT GLUCOSE   POCT GLUCOSE   POCT GLUCOSE   POCT GLUCOSE   POCT GLUCOSE   POCT GLUCOSE   POCT GLUCOSE   POCT GLUCOSE   POCT GLUCOSE   POCT GLUCOSE   POCT GLUCOSE   POCT GLUCOSE   POCT GLUCOSE   POCT GLUCOSE   POCT GLUCOSE   POCT GLUCOSE   POCT GLUCOSE   POCT GLUCOSE       All other labs were within normal range or not returned as of this dictation.    EMERGENCY DEPARTMENT COURSE and DIFFERENTIAL DIAGNOSIS/MDM:   Vitals:    Vitals:    08/18/24 0205 08/18/24 0701 08/18/24 1004 08/18/24 1319   BP:  (!) 127/53  (!) 103/44   Pulse:  60  59   Resp: 16 17 16 14   Temp:  97.4 °F (36.3 °C)  97 °F (36.1 °C)   TempSrc:  Temporal  Temporal   SpO2:  98%  97%   Weight:       Height:               MDM  Number of Diagnoses or Management Options  Acute cholecystitis  Diagnosis management comments: Labs and radiology studies reviewed.  Findings consistent with acute cholecystitis.  Zosyn ordered.  Surgery notified and patient admitted to hospitalist service.  Patient in agreement with plan.        MIPS       REASSESSMENT          CRITICAL CARE TIME   Total Critical Care time was  minutes, excluding separately reportable procedures.  There was a high probability of clinically significant/life threatening deterioration in the patient's condition which required my urgent intervention.      CONSULTS:  IP CONSULT TO SOCIAL WORK  IP CONSULT TO GENERAL SURGERY  IP CONSULT TO ANESTHESIOLOGY    PROCEDURES:  Unless otherwise noted below, none     Procedures        FINAL IMPRESSION      1. Pain    2. Acute cholecystitis          DISPOSITION/PLAN   DISPOSITION Admitted 08/14/2024 05:31:01 AM      PATIENT REFERRED TO:  Herber Vieira MD  [address] [place] [address]   [address] [place] [id number]  [phone]    Go on 8/26/2024  follow up visit

## 2024-08-26 NOTE — PROGRESS NOTES
Subjective:       Christina Sosa presents to the clinic 10 days  following robotic cholecystectomy for severe acute cholecystitis. States she does not remember much about her hospitalization, including not remembering me. Eating a regular diet without difficulty. Bowel movements are Normal.  Pain is controlled with current analgesics.  Medication(s) being used: narcotic analgesics including hydrocodone/acetaminophen (Lorcet, Lortab, Norco, Vicodin)..      Objective:      Blood pressure 137/68, pulse 67, weight 84.8 kg (187 lb).    General:  alert, appears stated age, and cooperative   Abdomen: soft, bowel sounds active, non-tender   Incision:   healing well, no drainage, no erythema, no hernia, no seroma, no swelling, no dehiscence, incision well approximated     Sathya-Gonzalez drainage is serous and minimal. Discontinued  Assessment:      Doing well postoperatively.      Plan:      1. Continue any current medications.  2. Wound care discussed.  3. Pt is to increase activities as tolerated.  4. Follow up with me prn  5. Additionally No orders of the defined types were placed in this encounter.

## 2024-08-28 ENCOUNTER — TELEPHONE (OUTPATIENT)
Dept: PHARMACY | Facility: CLINIC | Age: 64
End: 2024-08-28

## 2024-08-28 NOTE — TELEPHONE ENCOUNTER
[facility] CLINICAL PHARMACY: ADHERENCE REVIEW  Identified care gap per [place]: fills at [facility] : ACE/ARB and Statin adherence      ASSESSMENT    ACE/ARB ADHERENCE    Insurance Records claims through 24 (Prior Year PDC = not reported; YTD PDC = 78%; Potential Fail Date: 24):   LISINOPRIL 20 MG Next refill due: 24    Prescribed si tablet/capsule daily    Per [facility]:  not contacted    BP Readings from Last 3 Encounters:   24 137/68   24 (!) 103/44   24 127/62     Estimated Creatinine Clearance: 111 mL/min (based on SCr of 0.5 mg/dL).  Lab Results   Component Value Date    CREATININE 0.5 2024     Lab Results   Component Value Date    K 4.0 2024     STATIN ADHERENCE    Insurance Records claims through 24 (Prior Year PDC = not reported; YTD PDC = 78%; Potential Fail Date: 24):   PRAVASTATIN SODIUM 40 MG Next refill due: 24    Prescribed si tablet/capsule daily    Per [facility]:  not contacted    Lab Results   Component Value Date    CHOL 178 2024    TRIG 126 2024    HDL 39 2024     Lab Results   Component Value Date     2024      ALT   Date Value Ref Range Status   2024 15 5 - 33 U/L Final     AST   Date Value Ref Range Status   2024 14 <32 U/L Final     The 10-year ASCVD risk score (Morgan OLIVEROS, et al., 2019) is: 31.4%    Values used to calculate the score:      Age: 64 years      Sex: Female      Is Non- : No      Diabetic: Yes      Tobacco smoker: Yes      Systolic Blood Pressure: 137 mmHg      Is BP treated: Yes      HDL Cholesterol: 39 mg/dL      Total Cholesterol: 178 mg/dL       The following are interventions that have been identified:   Patient OVERDUE refilling both and active on home medication list.     Reached patient to review. Gave patient education: verified medication, instructions, and education.    States she gets pill packed and just received  after discharge from hospital    Last Visit: 3/19/24  Next Visit: 10/23/24      Grace Fernandez CPhT.   Population Health Clinical   [facility] Clinical [facility]  Toll free: [phone] Option 1     For Pharmacy Admin Tracking Only    Program: [employer]  CPA in place:  No  Recommendation Provided To: Patient/Caregiver: 2 via Telephone  Intervention Detail: Adherence Monitorin  Intervention Accepted By: Patient/Caregiver: 2  Gap Closed?: Yes   Time Spent (min): 15

## 2024-10-07 ENCOUNTER — TELEPHONE (OUTPATIENT)
Dept: PHARMACY | Facility: CLINIC | Age: 64
End: 2024-10-07

## 2024-10-07 NOTE — TELEPHONE ENCOUNTER
Patient called back regarding adherence.     Patient stated she is still taking both pravastatin 40mg and lisinopril 20mg both as prescribed, one tablet daily. Stated she takes this consistently and without issue. Asked patient if running out of medications since it was last refilled in August for 30 d/s and patient stated she still has plenty left. Stated she had received the pill packs for those last month. Attempted to ask patient about this and explain that if the pill packs for these are for 30 days that she may be running low or out of these medications since she said she picked up last month. Also let her know that her pharmacy does have refills and she can contact them to have filled for her. Patient again stated she had plenty left at the moment of each and will not be contacting pharmacy until a couple weeks from now.       Darryl Weinstein CPhT  [facility]  Clinical Pharmacy   Phone: [phone], Option #3

## 2024-10-07 NOTE — TELEPHONE ENCOUNTER
[facility] CLINICAL PHARMACY: ADHERENCE REVIEW  Identified care gap per [place]: fills at [facility] : ACE/ARB and Statin adherence    ASSESSMENT    ACE/ARB ADHERENCE    Insurance Records claims through 9/30/24 (Prior Year PDC = not reported; YTD PDC = 79%; Potential Fail Date: 10/12/24):   Lisinopril 20 mg tablets last filled on 8/19/24 for 30 day supply. Next refill due: 9/18/24  Per [place] portal, 4 refills remaining  Per 8/28/24 pharmacy outreach, patient gets pill packs.    BP Readings from Last 3 Encounters:   08/26/24 137/68   08/18/24 (!) 103/44   03/19/24 127/62     Estimated Creatinine Clearance: 111 mL/min (based on SCr of 0.5 mg/dL).  Lab Results   Component Value Date    CREATININE 0.5 08/18/2024     Lab Results   Component Value Date    K 4.0 08/18/2024     Lab Results   Component Value Date    LABA1C 7.8 (H) 08/13/2024    LABA1C 7.9 07/08/2024    LABA1C 6.6 02/05/2024     NOTE: A1c <9%    STATIN ADHERENCE    Insurance Records claims through 9/30/24 (Prior Year PDC = not reported; YTD PDC = 79%; Potential Fail Date: 10/12/24):   Pravastatin 40 mg tablets last filled on 8/19/24 for 30 day supply. Next refill due: 9/18/24  Per [place] portal, 4 refills remaining  Per 8/28/24 pharmacy outreach, patient gets pill packs.    Lab Results   Component Value Date    CHOL 178 07/08/2024    TRIG 126 07/08/2024    HDL 39 07/08/2024     Lab Results   Component Value Date     07/08/2024      ALT   Date Value Ref Range Status   08/18/2024 15 5 - 33 U/L Final     AST   Date Value Ref Range Status   08/18/2024 14 <32 U/L Final     The 10-year ASCVD risk score (Morgan OLIVEROS, et al., 2019) is: 31.4%    Values used to calculate the score:      Age: 64 years      Sex: Female      Is Non- : No      Diabetic: Yes      Tobacco smoker: Yes      Systolic Blood Pressure: 137 mmHg      Is BP treated: Yes      HDL Cholesterol: 39 mg/dL      Total Cholesterol: 178 mg/dL     PLAN  The following

## 2024-11-11 ENCOUNTER — TELEPHONE (OUTPATIENT)
Dept: PHARMACY | Facility: CLINIC | Age: 64
End: 2024-11-11

## 2024-11-11 NOTE — TELEPHONE ENCOUNTER
[facility] CLINICAL PHARMACY: ADHERENCE REVIEW  Identified care gap per [place]: fills at [facility] : ACE/ARB adherence        ASSESSMENT    ACE/ARB ADHERENCE  24  Insurance Records claims through 24 (Prior Year PDC = not reported; YTD PDC = 73%; Potential Fail Date: 24):   LISINOPRIL 20 MG Next refill due: 24    Prescribed si tablet/capsule daily    Per [facility]:   10/15 last filled.  Ready now and pt will  tomorrow  [id number]        BP Readings from Last 3 Encounters:   24 137/68   24 (!) 103/44   24 127/62     Estimated Creatinine Clearance: 111 mL/min (based on SCr of 0.5 mg/dL).  Lab Results   Component Value Date    CREATININE 0.5 2024     Lab Results   Component Value Date    K 4.0 2024         The following are interventions that have been identified:   Patient OVERDUE refilling lisinopril and active on home medication list.     No patient outreach planned at this time.      Last Visit: 3/19/24  Next Visit: 24    Grace Fernandez CPhT.   [facility]   [facility]  Toll free: [phone] Option 1     For Pharmacy Admin Tracking Only    Program: [facility] [place] MD  CPA in place:  No  Gap Closed?: No   Time Spent (min): 15

## (undated) DEVICE — APPLICATOR MEDICATED 26 CC SOLUTION HI LT ORNG CHLORAPREP

## (undated) DEVICE — ARM DRAPE

## (undated) DEVICE — ENDOSCOPIC KIT CLN SWAB MICROFIBER CLTH SCP CLEANOR DISP

## (undated) DEVICE — KIT DRN WND FLAT 3/4 PERF W/ 100CC EVAC 10MM

## (undated) DEVICE — SUCTION IRRIGATOR: Brand: ENDOWRIST

## (undated) DEVICE — STRIP,CLOSURE,WOUND,MEDI-STRIP,1/2X4: Brand: MEDLINE

## (undated) DEVICE — SUTURE VICRYL + SZ 4-0 L27IN ABSRB UD PS-2 3/8 CIR REV CUT VCP426H

## (undated) DEVICE — TRAP,MUCUS SPECIMEN, 80CC: Brand: MEDLINE

## (undated) DEVICE — BAG SPEC REM 224ML W4XL6IN DIA10MM 1 HND GYN DISP ENDOPCH

## (undated) DEVICE — PENCIL SMK EVAC L10FT TBNG NONSTICK ESU BLDE PLUMEPEN ELITE

## (undated) DEVICE — TROCARS: Brand: KII® BALLOON BLUNT TIP SYSTEM

## (undated) DEVICE — 3M™ TEGADERM™ +PAD FILM DRESSING WITH NON-ADHERENT PAD, 3587, 3-1/2 IN X 4-1/8 IN (9 CM X 10.5 CM), 25/CAR, 4 CAR/CS: Brand: 3M™ TEGADERM™

## (undated) DEVICE — MERCY TIFFIN BASIC LAP-LF: Brand: MEDLINE INDUSTRIES, INC.

## (undated) DEVICE — BLADELESS OBTURATOR: Brand: WECK VISTA

## (undated) DEVICE — SEAL

## (undated) DEVICE — GLOVE ORANGE PI 8   MSG9080

## (undated) DEVICE — SOLUTION IV 1000ML 0.9% SOD CHL FOR IRRIG PLAS CONT

## (undated) DEVICE — Device

## (undated) DEVICE — GOWN,SIRUS,POLYRNF,BRTHSLV,2XL,18/CS: Brand: MEDLINE

## (undated) DEVICE — SPONGE DRN W4XL4IN RAYON/POLYESTER 6 PLY NONWOVEN PRECUT 2 PER PK

## (undated) DEVICE — ELECTRO LUBE IS A SINGLE PATIENT USE DEVICE THAT IS INTENDED TO BE USED ON ELECTROSURGICAL ELECTRODES TO REDUCE STICKING.: Brand: KEY SURGICAL ELECTRO LUBE

## (undated) DEVICE — PAD PT POS 36 IN SURGYPAD DISP